# Patient Record
Sex: FEMALE | Race: WHITE | NOT HISPANIC OR LATINO | Employment: UNEMPLOYED | URBAN - METROPOLITAN AREA
[De-identification: names, ages, dates, MRNs, and addresses within clinical notes are randomized per-mention and may not be internally consistent; named-entity substitution may affect disease eponyms.]

---

## 2017-11-21 ENCOUNTER — ALLSCRIPTS OFFICE VISIT (OUTPATIENT)
Dept: OTHER | Facility: OTHER | Age: 72
End: 2017-11-21

## 2017-11-22 NOTE — CONSULTS
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)   4  Diabetes mellitus with neuropathy (250 60,357 2) (E11 40)   5  Onychomycosis (110 1) (B35 1)    Plan     · Gabapentin 300 MG Oral Capsule; TAKE 1 CAPSULE TWICE DAILY   · *VB - Foot Exam; Status:Complete;   Done: 55AHA9677 02:22PM   · Follow-up visit in 10 weeks Evaluation and Treatment  Follow-up  Status: Hold For -Scheduling  Requested for: 21Nov2017   · Diabetes Foot Exam Performed; Status:Complete;   Done: 68CMV0639 02:22PM   · Inspect your feet daily ; Status:Complete;   Done: 18GUY2092 02:22PM   · It is important to take good care of your feet if you have diabetes ; Status:Complete;  Done: 75TCV1129 02:22PM   · There are many things you can do at home to ensure good foot health ; Status:Complete;  Done: 43VLL6197 02:22PM   · Wear shoes that give your toes plenty of room ; Status:Complete;   Done: 84Lmg845725:22PM    Discussion/Summary    Use of gabapentin  The patient was counseled regarding diagnostic results,-- instructions for management,-- prognosis,-- patient and family education,-- risks and benefits of treatment options,-- importance of compliance with treatment  Patient is able to Self-Care  Possible side effects of new medications were reviewed with the patient/guardian today  The treatment plan was reviewed with the patient/guardian  The patient/guardian understands and agrees with the treatment plan      Chief Complaint  Routine nail care      History of Present Illness  HPI: Patient is diabetic  Patient complains of pain in her feet and legs at night  Patient has low back pain  Patient is taking gabapentin  She has not had any adjustment dosing over the last 4 years  Active Problems  1  Atherosclerosis of arteries of extremities (440 20) (I70 209)   2  Callus (700) (L84)   3   Deformity of ankle and foot, 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)   4  Diabetes mellitus with neuropathy (250 60,357 2) (E11 40)   5  Diabetic neuropathy (250 60,357 2) (E11 40)   6  Difficulty in walking (719 7) (R26 2)   7  Limb pain (729 5) (M79 609)   8  Onychomycosis (110 1) (B35 1)   9  Radiculopathy (729 2) (M54 10)    Social History     · Former smoker (E60 98) (E34 133)    Current Meds   1  Aspirin 81 MG TABS; Take 1 tablet twice daily; Therapy: (Recorded:21Nov2017) to Recorded   2  Carvedilol 25 MG Oral Tablet; Take 1 tablet daily; Therapy: (0472 51 11 42) to Recorded   3  Clopidogrel Bisulfate TABS; Therapy: (077 4766 3189) to Recorded   4  Furosemide 40 MG Oral Tablet; take 1 tablet every other day; Therapy: (0472 51 11 42) to Recorded   5  Gabapentin 100 MG Oral Capsule; TAKE 1 CAPSULE 3 TIMES DAILY; Therapy: 69JPW4382 to (Stefano Hassan)  Requested for: 26GQT8359; Last Rx:10Nov2014 Ordered   6  Gabapentin 100 MG Oral Capsule; TAKE 1 CAPSULE 3 TIMES DAILY; Therapy: 61NRE4334 to (Evaluate:62Hqm9493)  Requested for: 29IOC5469; Last Rx:24Oct2013 Ordered   7  Glucophage 500 MG Oral Tablet; TAKE 1 TABLET DAILY AS DIRECTED; Therapy: (0472 51 11 42) to Recorded   8  Lisinopril 20 MG Oral Tablet; TAKE 1 TABLET DAILY AS DIRECTED; Therapy: (0472 51 11 42) to Recorded   9  PARoxetine HCl TABS; TAKE 1 TABLET DAILY; Therapy: (Recorded:21Nov2017) to Recorded   10  Pravachol 40 MG Oral Tablet; TAKE 1 TABLET AT BEDTIME; Therapy: (Recorded:21Nov2017) to Recorded   11  Vitamin B-12 TABS; Therapy: (077 4766 3189) to Recorded   12  Vitamin C TABS; Therapy: (077 4766 3189) to Recorded   13  Vitamin D TABS; Therapy: (077 4766 3189) to Recorded   14  Vitamin E TABS; Therapy: (Recorded:24Oct2013) to Recorded    Allergies  1   Penicillins    Vitals   Recorded: 21Nov2017 02:04PM   Heart Rate 78   Respiration 17   Systolic 513   Diastolic 84   Height 5 ft 3 in   Weight 170 lb    BMI Calculated 30 11   BSA Calculated 1 8       Physical Exam   Constitutional: no acute distress, well appearing and well nourished  Cardiovascular: abnormal dorsalis pedis pulse,-- abnormal posterior tibialis pulse,-- abnormal capillary refill-- and-- edema  Orthopedic/Biomechanical: abnormal foot type,-- hammertoe(s),-- discolored nails-- and-- subungual debris  Skin: keratoses  Neurologic: decreased response to light touch,-- decreased vibration sensation-- and-- decreased response to monofilament testing  Psychiatric: oriented to person, place, and time  Left Foot: Appearance: Normal except as noted: excessive pronation-- and-- pes planus  Great toe deformities include a bunion  Second toe deformities include hammer toe  Tenderness: None except the medial longitudinal arch  Hyperkeratotic lesions first and fifth met heads bilateral significant xerosis bilateral negative erythema  ROM: Deferred  Special Tests: Rigid hammertoes 2 through 5 bilateral    Right Foot: Appearance: Normal except as noted: excessive pronation-- and-- pes planus  Great toe deformities include a bunion  Second toe deformities include corn  Tenderness: None except the medial longitudinal arch  ROM: Deferred  Left Ankle: ROM: limited ROM in all planes Motor: diffuse weakness  Right Ankle: ROM: limited ROM in all planes Motor: diffuse weakness  Neurological Exam: performed  Light touch was decreased bilaterally  Vibratory sensation was decreased in both first metatarsophalangeal joints  Vascular Exam: performed Dorsalis pedis pulses were diminished bilaterally  Posterior tibial pulses were diminished bilaterally  Capillary refill time was greater than 3 seconds bilaterally-- and-- Q  9, findings bilateral  Negative digital hair, positive abnormal cooling  Edema: mild bilaterally  Toenails: All of the toenails were hypertrophied-- and-- Mycotic with onychauxis  Socks and shoes removed, Right Foot Findings: swollen, erythematous and dry    The sensory exam showed diminished vibratory sensation at the level of the toes  Diminished tactile sensation with monofilament testing throughout the right foot  Socks and shoes removed, Left Foot Findings: swollen, erythematous and dry  The sensory exam showed diminished vibratory sensation at the level of the toes  Diminished tactile sensation with monofilament testing throughout the left foot  Hyperkeratosis: present on both first toes-- and-- present on both first sub metatarsals  Shoe Gear Evaluation: performed ()  Right Foot: width: d-- and-- length: 9  Left Foot: width: d-- and-- length: 9  Recommendation(s): custom inlays  Procedure  Diabetic foot exam performed  Patient educated on conditions  All mycotic nails debrided  Will increase patient's gabapentin dosing   She would benefit from custom diabetic inlays      Signatures   Electronically signed by : Lonnie Ralph DPM; Nov 21 2017  2:25PM EST                       (Author)

## 2018-01-03 ENCOUNTER — ALLSCRIPTS OFFICE VISIT (OUTPATIENT)
Dept: OTHER | Facility: OTHER | Age: 73
End: 2018-01-03

## 2018-01-04 NOTE — PROGRESS NOTES
Assessment   1  Atherosclerosis of arteries of extremities (440 20) (I70 209)   2  Callus (700) (L84)   3  Diabetes mellitus with neuropathy (250 60,357 2) (E11 40)   4  Onychomycosis (110 1) (B35 1)   5  Radiculopathy (729 2) (M54 10)    Plan    · Gabapentin 600 MG Oral Tablet; TAKE 1 TABLET TWICE DAILY   · *VB - Foot Exam; Status:Complete;   Done: 61ZMF9707 03:11PM   · Follow-up visit in 9 weeks Evaluation and Treatment  Follow-up  Status: Hold For -    Scheduling  Requested for: 67SJQ4500   · Diabetes Foot Exam Performed; Status:Complete;   Done: 01OGR0502 03:11PM   · Inspect your feet daily ; Status:Complete;   Done: 99XAO6185 03:11PM   · It is important to take good care of your feet if you have diabetes ; Status:Complete;      Done: 67TUN5833 03:11PM   · There are many things you can do at home to ensure good foot health ; Status:Complete;      Done: 98ZIG6176 03:11PM   · Wear shoes that give your toes plenty of room ; Status:Complete;   Done: 82NRI1344    03:11PM    Discussion/Summary      Use of gabapentin  The patient was counseled regarding diagnostic results,-- instructions for management,-- prognosis,-- patient and family education,-- risks and benefits of treatment options,-- importance of compliance with treatment  Patient is able to Self-Care  Possible side effects of new medications were reviewed with the patient/guardian today  The treatment plan was reviewed with the patient/guardian  The patient/guardian understands and agrees with the treatment plan      Chief Complaint   Routine nail care      History of Present Illness   HPI: Patient is diabetic  Patient complains of pain in her feet and legs at night  Patient has low back pain  Patient is taking gabapentin  She has not had any adjustment dosing over the last 4 years  Active Problems   1  Atherosclerosis of arteries of extremities (440 20) (I70 209)   2  Callus (700) (L84)   3   Deformity of ankle and foot, 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)   4  Diabetes mellitus with neuropathy (250 60,357 2) (E11 40)   5  Diabetic neuropathy (250 60,357 2) (E11 40)   6  Difficulty in walking (719 7) (R26 2)   7  Limb pain (729 5) (M79 609)   8  Onychomycosis (110 1) (B35 1)   9  Radiculopathy (729 2) (M54 10)    Social History    · Former smoker (M55 04) (I24 667)    Current Meds    1  Aspirin 81 MG TABS; Take 1 tablet twice daily; Therapy: (Recorded:21Nov2017) to Recorded   2  Carvedilol 25 MG Oral Tablet; Take 1 tablet daily; Therapy: (75 196 772) to Recorded   3  Clopidogrel Bisulfate TABS; Therapy: (468 6549) to Recorded   4  Furosemide 40 MG Oral Tablet; take 1 tablet every other day; Therapy: (75 196 772) to Recorded   5  Gabapentin 300 MG Oral Capsule; TAKE 1 CAPSULE TWICE DAILY; Therapy: 62EAV0177 to (Evaluate:20Jan2018)  Requested for: 21Nov2017; Last     Rx:21Nov2017 Ordered   6  Glucophage 500 MG Oral Tablet; TAKE 1 TABLET DAILY AS DIRECTED; Therapy: (75 196 772) to Recorded   7  Lisinopril 20 MG Oral Tablet; TAKE 1 TABLET DAILY AS DIRECTED; Therapy: (75 196 772) to Recorded   8  PARoxetine HCl TABS; TAKE 1 TABLET DAILY; Therapy: (75 196 772) to Recorded   9  Pravachol 40 MG Oral Tablet; TAKE 1 TABLET AT BEDTIME; Therapy: (Recorded:21Nov2017) to Recorded   10  Vitamin B-12 TABS; Therapy: (603 6549) to Recorded   11  Vitamin C TABS; Therapy: (468 6549) to Recorded   12  Vitamin D TABS; Therapy: (104 6549) to Recorded   13  Vitamin E TABS; Therapy: (Recorded:24Oct2013) to Recorded    Allergies   1  Penicillins    Vitals    Recorded: 47PBK9674 03:00PM   Heart Rate 80   Respiration 20   Systolic 524   Diastolic 83   Height 5 ft 3 in   Weight 170 lb    BMI Calculated 30 11   BSA Calculated 1 8     Physical Exam        Constitutional: no acute distress, well appearing and well nourished        Cardiovascular: abnormal dorsalis pedis pulse,-- abnormal posterior tibialis pulse,-- abnormal capillary refill-- and-- edema  Orthopedic/Biomechanical: abnormal foot type,-- hammertoe(s),-- discolored nails-- and-- subungual debris  Skin: keratoses  Neurologic: decreased response to light touch,-- decreased vibration sensation-- and-- decreased response to monofilament testing  Psychiatric: oriented to person, place, and time  Left Foot: Appearance: Normal except as noted: excessive pronation-- and-- pes planus  Great toe deformities include a bunion  Second toe deformities include hammer toe  Tenderness: None except the medial longitudinal arch  Hyperkeratotic lesions first and fifth met heads bilateral significant xerosis bilateral negative erythema  ROM: Deferred  Special Tests: Rigid hammertoes 2 through 5 bilateral     Right Foot: Appearance: Normal except as noted: excessive pronation-- and-- pes planus  Great toe deformities include a bunion  Second toe deformities include corn  Tenderness: None except the medial longitudinal arch  ROM: Deferred  Left Ankle: ROM: limited ROM in all planes Motor: diffuse weakness  Right Ankle: ROM: limited ROM in all planes Motor: diffuse weakness  Neurological Exam: performed  Light touch was decreased bilaterally  Vibratory sensation was decreased in both first metatarsophalangeal joints  Vascular Exam: performed Dorsalis pedis pulses were diminished bilaterally  Posterior tibial pulses were diminished bilaterally  Capillary refill time was greater than 3 seconds bilaterally-- and-- Q  9, findings bilateral  Negative digital hair, positive abnormal cooling  Edema: mild bilaterally  Toenails: All of the toenails were hypertrophied-- and-- Mycotic with onychauxis  Socks and shoes removed, Right Foot Findings: swollen, erythematous and dry  The sensory exam showed diminished vibratory sensation at the level of the toes   Diminished tactile sensation with monofilament testing throughout the right foot  Socks and shoes removed, Left Foot Findings: swollen, erythematous and dry  The sensory exam showed diminished vibratory sensation at the level of the toes  Diminished tactile sensation with monofilament testing throughout the left foot  Hyperkeratosis: present on both first toes-- and-- present on both first sub metatarsals  Shoe Gear Evaluation: performed ()  Right Foot: width: d-- and-- length: 9  Left Foot: width: d-- and-- length: 9  Recommendation(s): custom inlays  Procedure   Diabetic foot exam performed  Patient educated on conditions  All mycotic nails debrided  Will increase patient's gabapentin dosing   She would benefit from custom diabetic inlays      Future Appointments      Date/Time Provider Specialty Site   02/12/2018 01:30 PM Niles Mcardle, Nurse Schedule  [address] [facility]     Signatures    Electronically signed by : Alexus Phan DPM; Javi  3 2018  3:11PM EST                       (Author)

## 2018-01-14 VITALS
HEART RATE: 78 BPM | SYSTOLIC BLOOD PRESSURE: 121 MMHG | WEIGHT: 170 LBS | RESPIRATION RATE: 17 BRPM | DIASTOLIC BLOOD PRESSURE: 84 MMHG | BODY MASS INDEX: 30.12 KG/M2 | HEIGHT: 63 IN

## 2018-01-23 VITALS
BODY MASS INDEX: 30.12 KG/M2 | SYSTOLIC BLOOD PRESSURE: 182 MMHG | WEIGHT: 170 LBS | HEART RATE: 80 BPM | HEIGHT: 63 IN | DIASTOLIC BLOOD PRESSURE: 83 MMHG | RESPIRATION RATE: 20 BRPM

## 2018-03-14 ENCOUNTER — OFFICE VISIT (OUTPATIENT)
Dept: PODIATRY | Facility: CLINIC | Age: 73
End: 2018-03-14
Payer: MEDICARE

## 2018-03-14 VITALS
SYSTOLIC BLOOD PRESSURE: 139 MMHG | DIASTOLIC BLOOD PRESSURE: 70 MMHG | RESPIRATION RATE: 16 BRPM | HEIGHT: 62 IN | BODY MASS INDEX: 27.23 KG/M2 | WEIGHT: 148 LBS | HEART RATE: 76 BPM

## 2018-03-14 DIAGNOSIS — E11.42 DIABETIC POLYNEUROPATHY ASSOCIATED WITH TYPE 2 DIABETES MELLITUS (HCC): Primary | ICD-10-CM

## 2018-03-14 DIAGNOSIS — L84 CORNS: ICD-10-CM

## 2018-03-14 DIAGNOSIS — M79.671 PAIN IN BOTH FEET: ICD-10-CM

## 2018-03-14 DIAGNOSIS — M79.672 PAIN IN BOTH FEET: ICD-10-CM

## 2018-03-14 DIAGNOSIS — B35.1 ONYCHOMYCOSIS: ICD-10-CM

## 2018-03-14 DIAGNOSIS — I70.209 PERIPHERAL ARTERIOSCLEROSIS (HCC): ICD-10-CM

## 2018-03-14 DIAGNOSIS — M54.16 RADICULOPATHY OF LUMBAR REGION: ICD-10-CM

## 2018-03-14 PROCEDURE — 99212 OFFICE O/P EST SF 10 MIN: CPT | Performed by: PODIATRIST

## 2018-03-14 PROCEDURE — 11056 PARNG/CUTG B9 HYPRKR LES 2-4: CPT | Performed by: PODIATRIST

## 2018-03-14 RX ORDER — FUROSEMIDE 40 MG/1
40 TABLET ORAL DAILY PRN
COMMUNITY

## 2018-03-14 RX ORDER — LISINOPRIL 20 MG/1
20 TABLET ORAL DAILY
Status: ON HOLD | COMMUNITY
End: 2019-09-28

## 2018-03-14 RX ORDER — PAROXETINE HYDROCHLORIDE 40 MG/1
1 TABLET, FILM COATED ORAL DAILY
Status: ON HOLD | COMMUNITY
End: 2019-09-28 | Stop reason: CLARIF

## 2018-03-14 RX ORDER — CARVEDILOL 25 MG/1
25 TABLET ORAL 2 TIMES DAILY WITH MEALS
COMMUNITY

## 2018-03-14 RX ORDER — GABAPENTIN 100 MG/1
1 CAPSULE ORAL 3 TIMES DAILY
COMMUNITY
Start: 2014-11-10 | End: 2018-03-14 | Stop reason: SDUPTHER

## 2018-03-14 RX ORDER — CYANOCOBALAMIN (VITAMIN B-12) 500 MCG
400 LOZENGE ORAL DAILY
COMMUNITY

## 2018-03-14 RX ORDER — GABAPENTIN 100 MG/1
100 CAPSULE ORAL 3 TIMES DAILY
Qty: 90 CAPSULE | Refills: 1 | Status: SHIPPED | OUTPATIENT
Start: 2018-03-14 | End: 2018-04-13

## 2018-03-14 RX ORDER — CLOPIDOGREL 300 MG/1
TABLET, FILM COATED ORAL
Status: ON HOLD | COMMUNITY
End: 2019-09-28

## 2018-03-14 RX ORDER — RIBOFLAVIN (VITAMIN B2) 100 MG
500 TABLET ORAL DAILY
COMMUNITY

## 2018-03-14 RX ORDER — PRAVASTATIN SODIUM 40 MG
40 TABLET ORAL DAILY
Status: ON HOLD | COMMUNITY
End: 2019-09-28

## 2018-03-14 RX ORDER — UBIDECARENONE 75 MG
1000 CAPSULE ORAL DAILY
COMMUNITY

## 2018-03-14 NOTE — PROGRESS NOTES
Assessment/Plan:  Pain  Diabetic neuropathy  Callus of mycotic toenail  Plan  Diabetic foot exam performed  Refill of gabapentin ordered  All nails debrided  Plantar skin lesions debrided without pain or complication    No problem-specific Assessment & Plan notes found for this encounter  There are no diagnoses linked to this encounter  Subjective:      Patient ID: Yesica Zapata is a 71 y o  female  Patient is diabetic  She complains of pain in her feet with ambulation  No history of trauma  She is taking gabapentin  This gives her some relief  The following portions of the patient's history were reviewed and updated as appropriate: allergies, current medications, past family history, past medical history, past social history, past surgical history and problem list     Review of Systems    Active Problems   1  Atherosclerosis of arteries of extremities (440 20) (I70 209)   2  Callus (700) (L84)   3  Deformity of ankle and foot, 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)   4  Diabetes mellitus with neuropathy (250 60,357 2) (E11 40)   5  Diabetic neuropathy (250 60,357 2) (E11 40)   6  Difficulty in walking (719 7) (R26 2)   7  Limb pain (729 5) (M79 609)   8  Onychomycosis (110 1) (B35 1)   9  Radiculopathy (729 2) (M54 10)     Social History    · Former smoker (M86 32) (Z03 915)     Current Meds    1  Aspirin 81 MG TABS; Take 1 tablet twice daily; Therapy: (Recorded:21Nov2017) to Recorded   2  Carvedilol 25 MG Oral Tablet; Take 1 tablet daily; Therapy: (0472 51 11 42) to Recorded   3  Clopidogrel Bisulfate TABS; Therapy: (468 7325) to Recorded   4  Furosemide 40 MG Oral Tablet; take 1 tablet every other day; Therapy: (0472 51 11 42) to Recorded   5  Gabapentin 300 MG Oral Capsule; TAKE 1 CAPSULE TWICE DAILY; Therapy: 65QKM4413 to (Evaluate:20Jan2018)  Requested for: 21Nov2017; Last     Rx:21Nov2017 Ordered   6   Glucophage 500 MG Oral Tablet; TAKE 1 TABLET DAILY AS DIRECTED; Therapy: (66 846 207) to Recorded   7  Lisinopril 20 MG Oral Tablet; TAKE 1 TABLET DAILY AS DIRECTED; Therapy: (50 700 207) to Recorded   8  PARoxetine HCl TABS; TAKE 1 TABLET DAILY; Therapy: (94 349 207) to Recorded   9  Pravachol 40 MG Oral Tablet; TAKE 1 TABLET AT BEDTIME; Therapy: (Recorded:21Nov2017) to Recorded   10  Vitamin B-12 TABS; Therapy: (345 2180) to Recorded   11  Vitamin C TABS; Therapy: (095 6522) to Recorded   12  Vitamin D TABS; Therapy: (539 6579) to Recorded   13  Vitamin E TABS; Therapy: (Recorded:24Oct2013) to Recorded     Allergies   1  Penicillins     Vitals     Recorded: 84UNZ1338 03:00PM   Heart Rate 80   Respiration 20   Systolic 213   Diastolic 83   Height 5 ft 3 in   Weight 170 lb    BMI Calculated 30 11   BSA Calculated 1 8      Physical Exam        Constitutional: no acute distress, well appearing and well nourished  Cardiovascular: abnormal dorsalis pedis pulse,-- abnormal posterior tibialis pulse,-- abnormal capillary refill-- and-- edema  Orthopedic/Biomechanical: abnormal foot type,-- hammertoe(s),-- discolored nails-- and-- subungual debris  Skin: keratoses  Neurologic: decreased response to light touch,-- decreased vibration sensation-- and-- decreased response to monofilament testing  Psychiatric: oriented to person, place, and time  Left Foot: Appearance: Normal except as noted: excessive pronation-- and-- pes planus  Great toe deformities include a bunion  Second toe deformities include hammer toe  Tenderness: None except the medial longitudinal arch  Hyperkeratotic lesions first and fifth met heads bilateral significant xerosis bilateral negative erythema  ROM: Deferred  Special Tests: Rigid hammertoes 2 through 5 bilateral     Right Foot: Appearance: Normal except as noted: excessive pronation-- and-- pes planus   Great toe deformities include a bunion  Second toe deformities include corn  Tenderness: None except the medial longitudinal arch  ROM: Deferred  Left Ankle: ROM: limited ROM in all planes Motor: diffuse weakness  Right Ankle: ROM: limited ROM in all planes Motor: diffuse weakness  Neurological Exam: performed  Light touch was decreased bilaterally  Vibratory sensation was decreased in both first metatarsophalangeal joints  Vascular Exam: performed Dorsalis pedis pulses were diminished bilaterally  Posterior tibial pulses were diminished bilaterally  Capillary refill time was greater than 3 seconds bilaterally-- and-- Q  9, findings bilateral  Negative digital hair, positive abnormal cooling  Edema: mild bilaterally  Toenails: All of the toenails were hypertrophied-- and-- Mycotic with onychauxis  Socks and shoes removed, Right Foot Findings: swollen, erythematous and dry  The sensory exam showed diminished vibratory sensation at the level of the toes  Diminished tactile sensation with monofilament testing throughout the right foot  Socks and shoes removed, Left Foot Findings: swollen, erythematous and dry  The sensory exam showed diminished vibratory sensation at the level of the toes  Diminished tactile sensation with monofilament testing throughout the left foot  Hyperkeratosis: present on both first toes-- and-- present on both first sub metatarsals  Shoe Gear Evaluation: performed ()  Right Foot: width: d-- and-- length: 9  Left Foot: width: d-- and-- length: 9  Recommendation(s): custom inlays  Procedure   Diabetic foot exam performed  Patient educated on conditions  All mycotic nails debrided  Will increase patient's gabapentin dosing  She would benefit from custom diabetic inlays     Objective:      Foot Exam    Right Foot/Ankle     Inspection and Palpation  Skin Exam: callus and dry skin;     Neurovascular  Dorsalis pedis: 0  Posterior tibial: 1+      Left Foot/Ankle Inspection and Palpation  Skin Exam: callus and dry skin;     Neurovascular  Dorsalis pedis: 0  Posterior tibial: 1+        Physical Exam   Cardiovascular: Pulses are weak pulses  Pulses:       Dorsalis pedis pulses are 0 on the right side, and 0 on the left side  Posterior tibial pulses are 1+ on the right side, and 1+ on the left side  Feet:   Right Foot:   Skin Integrity: Positive for callus and dry skin  Left Foot:   Skin Integrity: Positive for callus and dry skin  Patient's shoes and socks removed  Right Foot/Ankle   Right Foot Inspection  Skin Exam: dry skin, callus, pre-ulcer and callus                          Toe Exam: swelling and erythema  Sensory   Vibration: absent  Proprioception: absent   Monofilament testing: absent  Vascular  Capillary refills: elevated  The right DP pulse is 0  The right PT pulse is 1+  Left Foot/Ankle  Left Foot Inspection  Skin Exam: dry skin, pre-ulcer and callus                         Toe Exam: swelling and erythema                     Vascular    The left DP pulse is 0  The left PT pulse is 1+  Assign Risk Category:  Deformity present;  Loss of protective sensation; Weak pulses       Risk: 2

## 2018-05-31 ENCOUNTER — OFFICE VISIT (OUTPATIENT)
Dept: PODIATRY | Facility: CLINIC | Age: 73
End: 2018-05-31
Payer: MEDICARE

## 2018-05-31 VITALS
BODY MASS INDEX: 27.23 KG/M2 | HEIGHT: 62 IN | SYSTOLIC BLOOD PRESSURE: 182 MMHG | WEIGHT: 148 LBS | DIASTOLIC BLOOD PRESSURE: 125 MMHG

## 2018-05-31 DIAGNOSIS — M79.671 PAIN IN BOTH FEET: ICD-10-CM

## 2018-05-31 DIAGNOSIS — M79.672 PAIN IN BOTH FEET: ICD-10-CM

## 2018-05-31 DIAGNOSIS — M54.16 RADICULOPATHY OF LUMBAR REGION: Primary | ICD-10-CM

## 2018-05-31 DIAGNOSIS — I70.209 PERIPHERAL ARTERIOSCLEROSIS (HCC): ICD-10-CM

## 2018-05-31 DIAGNOSIS — E11.42 DIABETIC POLYNEUROPATHY ASSOCIATED WITH TYPE 2 DIABETES MELLITUS (HCC): ICD-10-CM

## 2018-05-31 DIAGNOSIS — B35.1 ONYCHOMYCOSIS: ICD-10-CM

## 2018-05-31 DIAGNOSIS — L84 CORNS: ICD-10-CM

## 2018-05-31 PROCEDURE — 99212 OFFICE O/P EST SF 10 MIN: CPT | Performed by: PODIATRIST

## 2018-05-31 PROCEDURE — 11056 PARNG/CUTG B9 HYPRKR LES 2-4: CPT | Performed by: PODIATRIST

## 2018-05-31 RX ORDER — INSULIN GLARGINE 100 [IU]/ML
15 INJECTION, SOLUTION SUBCUTANEOUS EVERY 12 HOURS SCHEDULED
Status: ON HOLD | COMMUNITY
Start: 2018-04-16 | End: 2019-09-28

## 2018-05-31 RX ORDER — PAROXETINE 10 MG/1
20 TABLET, FILM COATED ORAL DAILY
COMMUNITY
End: 2019-11-21

## 2018-05-31 RX ORDER — CARVEDILOL 25 MG/1
25 TABLET ORAL
Status: ON HOLD | COMMUNITY
Start: 2017-05-30 | End: 2019-09-28

## 2018-05-31 RX ORDER — GABAPENTIN 100 MG/1
CAPSULE ORAL
COMMUNITY
Start: 2018-04-25 | End: 2019-05-31 | Stop reason: SDUPTHER

## 2018-05-31 RX ORDER — PAROXETINE HYDROCHLORIDE 20 MG/1
TABLET, FILM COATED ORAL
Status: ON HOLD | COMMUNITY
Start: 2018-05-10 | End: 2019-09-28

## 2018-05-31 RX ORDER — TRAMADOL HYDROCHLORIDE 50 MG/1
50 TABLET ORAL EVERY 8 HOURS PRN
Status: ON HOLD | COMMUNITY
Start: 2018-02-22 | End: 2019-09-28

## 2018-05-31 RX ORDER — INSULIN GLARGINE 100 [IU]/ML
INJECTION, SOLUTION SUBCUTANEOUS
Status: ON HOLD | COMMUNITY
End: 2019-09-28

## 2018-05-31 RX ORDER — LISINOPRIL 20 MG/1
20 TABLET ORAL
Status: ON HOLD | COMMUNITY
Start: 2017-08-11 | End: 2019-09-28

## 2018-05-31 RX ORDER — REPAGLINIDE 1 MG/1
TABLET ORAL
Status: ON HOLD | COMMUNITY
Start: 2016-10-03 | End: 2019-09-28

## 2018-05-31 RX ORDER — GABAPENTIN 600 MG/1
TABLET ORAL
Status: ON HOLD | COMMUNITY
Start: 2018-03-04 | End: 2019-09-28

## 2018-05-31 RX ORDER — GABAPENTIN 100 MG/1
100 CAPSULE ORAL
COMMUNITY
Start: 2017-11-14 | End: 2018-07-23 | Stop reason: SDUPTHER

## 2018-05-31 RX ORDER — RANITIDINE 150 MG/1
150 TABLET ORAL
COMMUNITY

## 2018-05-31 RX ORDER — PRAVASTATIN SODIUM 40 MG
40 TABLET ORAL
Status: ON HOLD | COMMUNITY
Start: 2017-03-24 | End: 2019-09-28

## 2018-05-31 RX ORDER — FUROSEMIDE 40 MG/1
40 TABLET ORAL
Status: ON HOLD | COMMUNITY
Start: 2017-08-31 | End: 2019-09-28

## 2018-05-31 RX ORDER — ASPIRIN 81 MG/1
81 TABLET, CHEWABLE ORAL DAILY
COMMUNITY

## 2018-05-31 NOTE — PROGRESS NOTES
Procedures   Foot Exam   Assessment/Plan:  Pain  Diabetic neuropathy  Callus of mycotic toenail      Plan  Diabetic foot exam performed  Refill of gabapentin ordered  All nails debrided  Plantar skin lesions debrided without pain or complication     No problem-specific Assessment & Plan notes found for this encounter          There are no diagnoses linked to this encounter        Subjective:       Patient ID: Topher Arredondo is a 71 y o  female      Patient is diabetic  She complains of pain in her feet with ambulation  No history of trauma  She is taking gabapentin  This gives her some relief         The following portions of the patient's history were reviewed and updated as appropriate: allergies, current medications, past family history, past medical history, past social history, past surgical history and problem list      Review of Systems    Active Problems   1  Atherosclerosis of arteries of extremities (440 20) (I70 209)   2  Callus (700) (L84)   3  Deformity of ankle and foot, 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)   4  Diabetes mellitus with neuropathy (250 60,357 2) (E11 40)   5  Diabetic neuropathy (250 60,357 2) (E11 40)   6  Difficulty in walking (719 7) (R26 2)   7  Limb pain (729 5) (M79 609)   8  Onychomycosis (110 1) (B35 1)   9  Radiculopathy (729 2) (M54 10)     Social History    · Former smoker (V15 82) (Z87 891)     Current Meds    1  Aspirin 81 MG TABS; Take 1 tablet twice daily;     Therapy: (Recorded:21Nov2017) to Recorded   2  Carvedilol 25 MG Oral Tablet;  Take 1 tablet daily;     Therapy: (Recorded:21Nov2017) to Recorded   3  Clopidogrel Bisulfate TABS;     Therapy: ([id number]) to Recorded   4  Furosemide 40 MG Oral Tablet; take 1 tablet every other day;     Therapy: (Recorded:21Nov2017) to Recorded   5  Gabapentin 300 MG Oral Capsule; TAKE 1 CAPSULE TWICE DAILY;     Therapy: [id number] to (Evaluate:20Jan2018)  Requested for: 21Nov2017; Last     Rx:21Nov2017 Ordered   6  Glucophage 500 MG Oral Tablet; TAKE 1 TABLET DAILY AS DIRECTED;     Therapy: (Recorded:21Nov2017) to Recorded   7  Lisinopril 20 MG Oral Tablet; TAKE 1 TABLET DAILY AS DIRECTED;     Therapy: (Recorded:21Nov2017) to Recorded   8  PARoxetine HCl TABS; TAKE 1 TABLET DAILY;     Therapy: (Recorded:21Nov2017) to Recorded   9  Pravachol 40 MG Oral Tablet; TAKE 1 TABLET AT BEDTIME;     Therapy: (Recorded:21Nov2017) to Recorded   10  Vitamin B-12 TABS;      Therapy: (Recorded:24Oct2013) to Recorded   11  Vitamin C TABS;      Therapy: (Recorded:24Oct2013) to Recorded   12  Vitamin D TABS;      Therapy: (Recorded:24Oct2013) to Recorded   13  Vitamin E TABS;      Therapy: (Recorded:24Oct2013) to Recorded     Allergies   1  Penicillins     Vitals        Heart Rate 80   Respiration 20   Systolic 542   Diastolic 83   Height 5 ft 3 in   Weight 170 lb    BMI Calculated 30 11   BSA Calculated 1 8      Physical Exam        Constitutional: no acute distress, well appearing and well nourished       Cardiovascular: abnormal dorsalis pedis pulse,-- abnormal posterior tibialis pulse,-- abnormal capillary refill-- and-- edema       Orthopedic/Biomechanical: abnormal foot type,-- hammertoe(s),-- discolored nails-- and-- subungual debris       Skin: keratoses       Neurologic: decreased response to light touch,-- decreased vibration sensation-- and-- decreased response to monofilament testing       Psychiatric: oriented to person, place, and time     Left Foot: Appearance: Normal except as noted: excessive pronation-- and-- pes planus  Great toe deformities include a bunion  Second toe deformities include hammer toe  Tenderness: None except the medial longitudinal arch  Hyperkeratotic lesions first and fifth met heads bilateral significant xerosis bilateral negative erythema  ROM: Deferred  Special Tests: Rigid hammertoes 2 through 5 bilateral     Right Foot: Appearance: Normal except as noted: excessive pronation-- and-- pes planus   Great toe deformities include a bunion  Second toe deformities include corn  Tenderness: None except the medial longitudinal arch  ROM: Deferred     Left Ankle: ROM: limited ROM in all planes Motor: diffuse weakness     Right Ankle: ROM: limited ROM in all planes Motor: diffuse weakness     Neurological Exam: performed  Light touch was decreased bilaterally  Vibratory sensation was decreased in both first metatarsophalangeal joints     Vascular Exam: performed Dorsalis pedis pulses were diminished bilaterally  Posterior tibial pulses were diminished bilaterally  Capillary refill time was greater than 3 seconds bilaterally-- and-- Q  9, findings bilateral  Negative digital hair, positive abnormal cooling  Edema: mild bilaterally     Toenails: All of the toenails were hypertrophied-- and-- Mycotic with onychauxis          Socks and shoes removed, Right Foot Findings: swollen, erythematous and dry       The sensory exam showed diminished vibratory sensation at the level of the toes  Diminished tactile sensation with monofilament testing throughout the right foot       Socks and shoes removed, Left Foot Findings: swollen, erythematous and dry       The sensory exam showed diminished vibratory sensation at the level of the toes  Diminished tactile sensation with monofilament testing throughout the left foot         Hyperkeratosis: present on both first toes-- and-- present on both first sub metatarsals     Shoe Gear Evaluation: performed ()  Right Foot: width: d-- and-- length: 9  Left Foot: width: d-- and-- length: 9  Recommendation(s): custom inlays       Procedure   Diabetic foot exam performed  Patient educated on conditions     Objective:      Foot Exam     Right Foot/Ankle      Inspection and Palpation  Skin Exam: callus and dry skin;      Neurovascular  Dorsalis pedis: 0  Posterior tibial: 1+        Left Foot/Ankle       Inspection and Palpation  Skin Exam: callus and dry skin;      Neurovascular  Dorsalis pedis: 0  Posterior tibial: 1+        Physical Exam   Cardiovascular: Pulses are weak pulses  Pulses:       Dorsalis pedis pulses are 0 on the right side, and 0 on the left side  Posterior tibial pulses are 1+ on the right side, and 1+ on the left side  Feet:   Right Foot:   Skin Integrity: Positive for callus and dry skin  Left Foot:   Skin Integrity: Positive for callus and dry skin  Patient's shoes and socks removed  Right Foot/Ankle   Right Foot Inspection  Skin Exam: dry skin, callus, pre-ulcer and callus                           Toe Exam: swelling and erythema  Sensory   Vibration: absent  Proprioception: absent   Monofilament testing: absent  Vascular  Capillary refills: elevated  The right DP pulse is 0  The right PT pulse is 1+       Left Foot/Ankle  Left Foot Inspection  Skin Exam: dry skin, pre-ulcer and callus                          Toe Exam: swelling and erythema                      Vascular     The left DP pulse is 0  The left PT pulse is 1+  Assign Risk Category:  Deformity present;  Loss of protective sensation; Weak pulses       Risk: 2

## 2018-07-23 DIAGNOSIS — M79.671 PAIN IN BOTH FEET: Primary | ICD-10-CM

## 2018-07-23 DIAGNOSIS — M79.672 PAIN IN BOTH FEET: Primary | ICD-10-CM

## 2018-07-23 RX ORDER — GABAPENTIN 100 MG/1
100 CAPSULE ORAL 3 TIMES DAILY
Qty: 90 CAPSULE | Refills: 0 | Status: SHIPPED | OUTPATIENT
Start: 2018-07-23

## 2018-08-10 ENCOUNTER — OFFICE VISIT (OUTPATIENT)
Dept: PODIATRY | Facility: CLINIC | Age: 73
End: 2018-08-10
Payer: MEDICARE

## 2018-08-10 VITALS
SYSTOLIC BLOOD PRESSURE: 195 MMHG | RESPIRATION RATE: 17 BRPM | BODY MASS INDEX: 27.23 KG/M2 | HEART RATE: 66 BPM | HEIGHT: 62 IN | WEIGHT: 148 LBS | DIASTOLIC BLOOD PRESSURE: 81 MMHG

## 2018-08-10 DIAGNOSIS — M79.672 PAIN IN BOTH FEET: ICD-10-CM

## 2018-08-10 DIAGNOSIS — I70.209 PERIPHERAL ARTERIOSCLEROSIS (HCC): ICD-10-CM

## 2018-08-10 DIAGNOSIS — M54.16 RADICULOPATHY OF LUMBAR REGION: Primary | ICD-10-CM

## 2018-08-10 DIAGNOSIS — E11.42 DIABETIC POLYNEUROPATHY ASSOCIATED WITH TYPE 2 DIABETES MELLITUS (HCC): ICD-10-CM

## 2018-08-10 DIAGNOSIS — L84 CORNS: ICD-10-CM

## 2018-08-10 DIAGNOSIS — B35.1 ONYCHOMYCOSIS: ICD-10-CM

## 2018-08-10 DIAGNOSIS — M79.671 PAIN IN BOTH FEET: ICD-10-CM

## 2018-08-10 PROCEDURE — 11056 PARNG/CUTG B9 HYPRKR LES 2-4: CPT | Performed by: PODIATRIST

## 2018-08-10 PROCEDURE — 99212 OFFICE O/P EST SF 10 MIN: CPT | Performed by: PODIATRIST

## 2018-08-10 RX ORDER — GABAPENTIN 300 MG/1
300 CAPSULE ORAL 2 TIMES DAILY
Qty: 60 CAPSULE | Refills: 1 | Status: SHIPPED | OUTPATIENT
Start: 2018-08-10 | End: 2019-05-31 | Stop reason: SDUPTHER

## 2018-08-10 NOTE — PROGRESS NOTES
Procedures   Foot Exam       Assessment/Plan:  Pain   Diabetic neuropathy   Callus of mycotic toenail      Plan   Diabetic foot exam performed   Refill of gabapentin ordered   All nails debrided   Plantar skin lesions debrided without pain or complication     No problem-specific Assessment & Plan notes found for this encounter          There are no diagnoses linked to this encounter        Subjective:   Patient has pain in her feet  Patient states she has to undergo vascular workup and treatment  She still has low back pain  She has pain in her back and feet at night       Patient ID: Bess Dillard is a 71 y  o  female      Patient is diabetic   She complains of pain in her feet with ambulation   No history of trauma   She is taking gabapentin   This gives her some relief         The following portions of the patient's history were reviewed and updated as appropriate: allergies, current medications, past family history, past medical history, past social history, past surgical history and problem list      Review of Systems    Active Problems   1  Atherosclerosis of arteries of extremities (440 20) (I70 209)   2  Callus (700) (L84)   3  Deformity of ankle and foot, 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)   4  Diabetes mellitus with neuropathy (250 60,357 2) (E11 40)   5  Diabetic neuropathy (250 60,357 2) (E11 40)   6  Difficulty in walking (719 7) (R26 2)   7  Limb pain (729 5) (M79 609)   8  Onychomycosis (110 1) (B35 1)   9  Radiculopathy (729 2) (M54 10)     Social History    · Former smoker (V15 82) (Z87 891)     Current Meds    1  Aspirin 81 MG TABS; Take 1 tablet twice daily;     Therapy: (Recorded:21Nov2017) to Recorded   2  Carvedilol 25 MG Oral Tablet;  Take 1 tablet daily;     Therapy: (Recorded:21Nov2017) to Recorded   3  Clopidogrel Bisulfate TABS;     Therapy: ([id number]) to Recorded   4  Furosemide 40 MG Oral Tablet; take 1 tablet every other day;     Therapy: (Ryan Ibarra) to Recorded   5  Gabapentin 300 MG Oral Capsule; TAKE 1 CAPSULE TWICE DAILY;     Therapy: [id number] to (Evaluate:20Jan2018)  Requested for: 21Nov2017; Last     Rx:21Nov2017 Ordered   6  Glucophage 500 MG Oral Tablet; TAKE 1 TABLET DAILY AS DIRECTED;     Therapy: (Recorded:21Nov2017) to Recorded   7  Lisinopril 20 MG Oral Tablet; TAKE 1 TABLET DAILY AS DIRECTED;     Therapy: (Recorded:21Nov2017) to Recorded   8  PARoxetine HCl TABS; TAKE 1 TABLET DAILY;     Therapy: (Recorded:21Nov2017) to Recorded   9  Pravachol 40 MG Oral Tablet; TAKE 1 TABLET AT BEDTIME;     Therapy: (Recorded:21Nov2017) to Recorded   10  Vitamin B-12 TABS;      Therapy: (Recorded:24Oct2013) to Recorded   11  Vitamin C TABS;      Therapy: (Recorded:24Oct2013) to Recorded   12  Vitamin D TABS;      Therapy: (Recorded:24Oct2013) to Recorded   13  Vitamin E TABS;      Therapy: (Recorded:24Oct2013) to Recorded     Allergies   1  Penicillins     Vitals         Heart Rate 80   Respiration 20   Systolic 832   Diastolic 83   Height 5 ft 3 in   Weight 170 lb    BMI Calculated 30 11   BSA Calculated 1 8      Physical Exam        Constitutional: no acute distress, well appearing and well nourished       Cardiovascular: abnormal dorsalis pedis pulse,-- abnormal posterior tibialis pulse,-- abnormal capillary refill-- and-- edema       Orthopedic/Biomechanical: abnormal foot type,-- hammertoe(s),-- discolored nails-- and-- subungual debris       Skin: keratoses       Neurologic: decreased response to light touch,-- decreased vibration sensation-- and-- decreased response to monofilament testing       Psychiatric: oriented to person, place, and time     Left Foot: Appearance: Normal except as noted: excessive pronation-- and-- pes planus  Great toe deformities include a bunion  Second toe deformities include hammer toe  Tenderness: None except the medial longitudinal arch   Hyperkeratotic lesions first and fifth met heads bilateral significant xerosis bilateral negative erythema  ROM: Deferred  Special Tests: Rigid hammertoes 2 through 5 bilateral     Right Foot: Appearance: Normal except as noted: excessive pronation-- and-- pes planus  Great toe deformities include a bunion  Second toe deformities include corn  Tenderness: None except the medial longitudinal arch  ROM: Deferred     Left Ankle: ROM: limited ROM in all planes Motor: diffuse weakness     Right Ankle: ROM: limited ROM in all planes Motor: diffuse weakness     Neurological Exam: performed  Light touch was decreased bilaterally  Vibratory sensation was decreased in both first metatarsophalangeal joints     Vascular Exam: performed Dorsalis pedis pulses were diminished bilaterally  Posterior tibial pulses were diminished bilaterally  Capillary refill time was greater than 3 seconds bilaterally-- and-- Q  9, findings bilateral  Negative digital hair, positive abnormal cooling  Edema: mild bilaterally     Toenails: All of the toenails were hypertrophied-- and-- Mycotic with onychauxis          Socks and shoes removed, Right Foot Findings: swollen, erythematous and dry       The sensory exam showed diminished vibratory sensation at the level of the toes  Diminished tactile sensation with monofilament testing throughout the right foot       Socks and shoes removed, Left Foot Findings: swollen, erythematous and dry       The sensory exam showed diminished vibratory sensation at the level of the toes  Diminished tactile sensation with monofilament testing throughout the left foot         Hyperkeratosis: present on both first toes-- and-- present on both first sub metatarsals     Shoe Gear Evaluation: performed ()  Right Foot: width: d-- and-- length: 9  Left Foot: width: d-- and-- length: 9  Recommendation(s): custom inlays       Procedure   Diabetic foot exam performed  Patient educated on conditions  All nails debrided    Patient will continue with gabapentin  Objective:      Foot Exam     Right Foot/Ankle      Inspection and Palpation  Skin Exam: callus and dry skin;      Neurovascular  Dorsalis pedis: 0  Posterior tibial: 1+        Left Foot/Ankle       Inspection and Palpation  Skin Exam: callus and dry skin;      Neurovascular  Dorsalis pedis: 0  Posterior tibial: 1+        Physical Exam   Cardiovascular: Pulses are weak pulses  Pulses:       Dorsalis pedis pulses are 0 on the right side, and 0 on the left side         Posterior tibial pulses are 1+ on the right side, and 1+ on the left side  Feet:   Right Foot:   Skin Integrity: Positive for callus and dry skin  Left Foot:   Skin Integrity: Positive for callus and dry skin  Patient's shoes and socks removed  Right Foot/Ankle   Right Foot Inspection  Skin Exam: dry skin, callus, pre-ulcer and callus               Toe Exam: swelling and erythema  Sensory   Vibration: absent  Proprioception: absent   Monofilament testing: absent  Vascular  Capillary refills: elevated  The right DP pulse is 0  The right PT pulse is 1+       Left Foot/Ankle  Left Foot Inspection  Skin Exam: dry skin, pre-ulcer and callus              Toe Exam: swelling and erythema                      Vascular     The left DP pulse is 0  The left PT pulse is 1+  Assign Risk Category:  Deformity present;  Loss of protective sensation; Weak pulses       Risk: 2

## 2019-05-31 DIAGNOSIS — E11.42 DIABETIC POLYNEUROPATHY ASSOCIATED WITH TYPE 2 DIABETES MELLITUS (HCC): ICD-10-CM

## 2019-05-31 DIAGNOSIS — M54.16 RADICULOPATHY OF LUMBAR REGION: ICD-10-CM

## 2019-05-31 RX ORDER — GABAPENTIN 300 MG/1
300 CAPSULE ORAL 2 TIMES DAILY
Qty: 60 CAPSULE | Refills: 1 | Status: ON HOLD | OUTPATIENT
Start: 2019-05-31 | End: 2019-09-28 | Stop reason: ALTCHOICE

## 2019-05-31 RX ORDER — GABAPENTIN 100 MG/1
300 CAPSULE ORAL 2 TIMES DAILY
Qty: 180 CAPSULE | Refills: 0 | Status: ON HOLD | OUTPATIENT
Start: 2019-05-31 | End: 2019-09-28 | Stop reason: ALTCHOICE

## 2019-08-02 ENCOUNTER — HOSPITAL ENCOUNTER (EMERGENCY)
Facility: HOSPITAL | Age: 74
Discharge: HOME | End: 2019-08-02
Attending: EMERGENCY MEDICINE | Admitting: EMERGENCY MEDICINE
Payer: MEDICARE

## 2019-08-02 VITALS
BODY MASS INDEX: 27.23 KG/M2 | TEMPERATURE: 98.4 F | HEIGHT: 62 IN | DIASTOLIC BLOOD PRESSURE: 72 MMHG | OXYGEN SATURATION: 99 % | WEIGHT: 148 LBS | HEART RATE: 60 BPM | SYSTOLIC BLOOD PRESSURE: 175 MMHG | RESPIRATION RATE: 20 BRPM

## 2019-08-02 DIAGNOSIS — R73.9 HYPERGLYCEMIA: Primary | ICD-10-CM

## 2019-08-02 DIAGNOSIS — E11.9 DIABETES (HCC): ICD-10-CM

## 2019-08-02 LAB
ALBUMIN SERPL BCP-MCNC: 3.3 G/DL (ref 3.5–5)
ALP SERPL-CCNC: 101 U/L (ref 46–116)
ALT SERPL W P-5'-P-CCNC: 25 U/L (ref 12–78)
ANION GAP SERPL CALCULATED.3IONS-SCNC: 11 MMOL/L (ref 4–13)
AST SERPL W P-5'-P-CCNC: 8 U/L (ref 5–45)
BASE EX.OXY STD BLDV CALC-SCNC: 81.8 % (ref 60–80)
BASE EXCESS BLDV CALC-SCNC: -2.5 MMOL/L
BASOPHILS # BLD AUTO: 0.04 THOUSANDS/ΜL (ref 0–0.1)
BASOPHILS NFR BLD AUTO: 1 % (ref 0–1)
BETA-HYDROXYBUTYRATE: 0.1 MMOL/L
BILIRUB SERPL-MCNC: 0.3 MG/DL (ref 0.2–1)
BUN SERPL-MCNC: 16 MG/DL (ref 5–25)
CALCIUM SERPL-MCNC: 8.7 MG/DL (ref 8.3–10.1)
CHLORIDE SERPL-SCNC: 95 MMOL/L (ref 100–108)
CO2 SERPL-SCNC: 24 MMOL/L (ref 21–32)
CREAT SERPL-MCNC: 1.75 MG/DL (ref 0.6–1.3)
EOSINOPHIL # BLD AUTO: 0.19 THOUSAND/ΜL (ref 0–0.61)
EOSINOPHIL NFR BLD AUTO: 3 % (ref 0–6)
ERYTHROCYTE [DISTWIDTH] IN BLOOD BY AUTOMATED COUNT: 13.4 % (ref 11.6–15.1)
GFR SERPL CREATININE-BSD FRML MDRD: 29 ML/MIN/1.73SQ M
GLUCOSE SERPL-MCNC: 385 MG/DL (ref 65–140)
GLUCOSE SERPL-MCNC: 466 MG/DL (ref 65–140)
GLUCOSE SERPL-MCNC: 467 MG/DL (ref 65–140)
HCO3 BLDV-SCNC: 24 MMOL/L (ref 24–30)
HCT VFR BLD AUTO: 33.1 % (ref 34.8–46.1)
HGB BLD-MCNC: 10.7 G/DL (ref 11.5–15.4)
IMM GRANULOCYTES # BLD AUTO: 0.02 THOUSAND/UL (ref 0–0.2)
IMM GRANULOCYTES NFR BLD AUTO: 0 % (ref 0–2)
LYMPHOCYTES # BLD AUTO: 1.95 THOUSANDS/ΜL (ref 0.6–4.47)
LYMPHOCYTES NFR BLD AUTO: 30 % (ref 14–44)
MAGNESIUM SERPL-MCNC: 2 MG/DL (ref 1.6–2.6)
MCH RBC QN AUTO: 29.4 PG (ref 26.8–34.3)
MCHC RBC AUTO-ENTMCNC: 32.3 G/DL (ref 31.4–37.4)
MCV RBC AUTO: 91 FL (ref 82–98)
MONOCYTES # BLD AUTO: 0.58 THOUSAND/ΜL (ref 0.17–1.22)
MONOCYTES NFR BLD AUTO: 9 % (ref 4–12)
NEUTROPHILS # BLD AUTO: 3.81 THOUSANDS/ΜL (ref 1.85–7.62)
NEUTS SEG NFR BLD AUTO: 57 % (ref 43–75)
NRBC BLD AUTO-RTO: 0 /100 WBCS
O2 CT BLDV-SCNC: 13.6 ML/DL
PCO2 BLDV: 48.7 MM HG (ref 42–50)
PH BLDV: 7.31 [PH] (ref 7.3–7.4)
PLATELET # BLD AUTO: 237 THOUSANDS/UL (ref 149–390)
PMV BLD AUTO: 10.2 FL (ref 8.9–12.7)
PO2 BLDV: 49.4 MM HG (ref 35–45)
POTASSIUM SERPL-SCNC: 4 MMOL/L (ref 3.5–5.3)
PROT SERPL-MCNC: 6.3 G/DL (ref 6.4–8.2)
RBC # BLD AUTO: 3.64 MILLION/UL (ref 3.81–5.12)
SODIUM SERPL-SCNC: 130 MMOL/L (ref 136–145)
WBC # BLD AUTO: 6.59 THOUSAND/UL (ref 4.31–10.16)

## 2019-08-02 PROCEDURE — 82948 REAGENT STRIP/BLOOD GLUCOSE: CPT

## 2019-08-02 PROCEDURE — 80053 COMPREHEN METABOLIC PANEL: CPT | Performed by: EMERGENCY MEDICINE

## 2019-08-02 PROCEDURE — 96360 HYDRATION IV INFUSION INIT: CPT

## 2019-08-02 PROCEDURE — 93005 ELECTROCARDIOGRAM TRACING: CPT

## 2019-08-02 PROCEDURE — 85025 COMPLETE CBC W/AUTO DIFF WBC: CPT | Performed by: EMERGENCY MEDICINE

## 2019-08-02 PROCEDURE — 96372 THER/PROPH/DIAG INJ SC/IM: CPT

## 2019-08-02 PROCEDURE — 82805 BLOOD GASES W/O2 SATURATION: CPT | Performed by: EMERGENCY MEDICINE

## 2019-08-02 PROCEDURE — 83735 ASSAY OF MAGNESIUM: CPT | Performed by: EMERGENCY MEDICINE

## 2019-08-02 PROCEDURE — 36415 COLL VENOUS BLD VENIPUNCTURE: CPT | Performed by: EMERGENCY MEDICINE

## 2019-08-02 PROCEDURE — 99283 EMERGENCY DEPT VISIT LOW MDM: CPT

## 2019-08-02 PROCEDURE — 82010 KETONE BODYS QUAN: CPT | Performed by: EMERGENCY MEDICINE

## 2019-08-02 RX ADMIN — SODIUM CHLORIDE 1000 ML: 0.9 INJECTION, SOLUTION INTRAVENOUS at 14:55

## 2019-08-02 RX ADMIN — INSULIN HUMAN 10 UNITS: 100 INJECTION, SOLUTION PARENTERAL at 16:16

## 2019-08-02 NOTE — DISCHARGE INSTRUCTIONS
Diabetic Hyperglycemia   AMBULATORY CARE:   Diabetic hyperglycemia  is a blood glucose (sugar) level that is higher than your healthcare provider recommends  You may not have any signs and symptoms  You may have increased thirst and urinate more often than usual   Seek care immediately if:   · You have shortness of breath  · Your breath smells fruity  · You have nausea and vomiting  · You have symptoms of dehydration, such as dark yellow urine, dry mouth and lips, and dry skin  Contact your healthcare provider if:   · You continue to have higher blood sugar levels than your healthcare provider recommends  · Your blood sugar level is over 240 mg/dl and  you have ketones in your urine  · You have questions or concerns about your condition or care  Manage diabetic hyperglycemia:   · If you take diabetes medicine or insulin, take it as directed  Missed or wrong doses can cause your blood sugar to go up  · Tell your healthcare provider if you continue to have trouble managing your blood sugar  He may change the type, amount, or timing of your diabetes medicine or insulin  If you do not take diabetes medicine or insulin, you may need to start  · Work with your healthcare provider to develop a sick day plan  Illness can cause your blood sugar to rise  A sick day plan helps you control your blood sugar level when you are sick  Prevent diabetic hyperglycemia:   · Check your blood sugar levels regularly  Ask your healthcare provider how often to check your blood sugar and what your levels should be  · Follow your meal plan  Your blood sugar can go up if you eat a large meal or you eat more carbohydrates than recommended  Work with a dietitian to develop a meal plan that is right for you  · Exercise regularly  to help lower your blood sugar when it is high  It can also keep your blood sugar levels steady over time  Exercise for at least 30 minutes, 5 days a week   Include muscle strengthening activities 2 days each week  Do not sit for longer than 90 minutes at a time  Work with your healthcare provider to create an exercise plan  Children should get at least 60 minutes of physical activity each day  · Check your ketones before exercise  if your blood sugar level is above 240 mg/dl  Do not exercise if you have ketones in your urine, because your blood sugar level may rise even more  Ask your healthcare provider how to lower your blood sugar when you have ketones  Follow up with your healthcare provider as directed:  Write down your questions so you remember to ask them during your visits  © 2017 [employer] Information is for End User's use only and may not be sold, redistributed or otherwise used for commercial purposes  All illustrations and images included in CareNotes® are the copyrighted property of [employer]  or [employer]  The above information is an  only  It is not intended as medical advice for individual conditions or treatments  Talk to your doctor, nurse or pharmacist before following any medical regimen to see if it is safe and effective for you

## 2019-08-02 NOTE — ED PROVIDER NOTES
History  Chief Complaint   Patient presents with    Hyperglycemia - no symptoms     patient states her family made her come in, because her sugar was >500  Patient took insulin before coming, about 20 units  Patient offers no complaints and states "I feel fine"  69 y/o female presents with no symptoms, but checked her blood sugar which was >400 at home  Said she took her insulin today, and has been compliant  Denies any change in diet, no dysuria, urgency or frequency, no chest pain, dyspnea  No cough,fevers,chills or any other symptoms  History provided by:  Patient   used: No        Prior to Admission Medications   Prescriptions Last Dose Informant Patient Reported? Taking?    Ascorbic Acid (VITAMIN C) 100 MG tablet   Yes No   Sig: Take by mouth   BASAGLAR KWIKPEN 100 units/mL injection pen   Yes No   Cholecalciferol 1000 units CHEW   Yes No   Sig: Chew 2,000 Units   FUROSEMIDE PO   Yes No   Sig: take 2 milliliter by oral route  every day   PARoxetine (PAXIL) 10 mg tablet   Yes No   Sig: take 1 tablet by oral route  every day   PARoxetine (PAXIL) 20 mg tablet   Yes No   PARoxetine (PAXIL) 40 MG tablet   Yes No   Sig: Take 1 tablet by mouth daily   Vitamin E 400 units TABS   Yes No   Sig: Take by mouth   aspirin 81 MG tablet   Yes No   Sig: Take 1 tablet by mouth 2 (two) times a day   aspirin 81 MG tablet   Yes No   Sig: take 1 tablet by oral route  every day   aspirin 81 mg chewable tablet   Yes No   Sig: Chew 81 mg   carvedilol (COREG) 25 mg tablet   Yes No   Sig: Take 1 tablet by mouth daily   carvedilol (COREG) 25 mg tablet   Yes No   Sig: Take 25 mg by mouth   cholecalciferol (VITAMIN D3) 1,000 units tablet   Yes No   Sig: Take by mouth   clopidogrel (PLAVIX) 300 mg   Yes No   Sig: Take by mouth   cyanocobalamin (VITAMIN B-12) 100 mcg tablet   Yes No   Sig: Take by mouth   furosemide (LASIX) 40 mg tablet   Yes No   Sig: Take 1 tablet by mouth every other day   furosemide (LASIX) 40 mg tablet   Yes No   Sig: Take 40 mg by mouth   gabapentin (NEURONTIN) 100 mg capsule   No No   Sig: Take 1 capsule (100 mg total) by mouth 3 (three) times a day   gabapentin (NEURONTIN) 100 mg capsule   No No   Sig: Take 3 capsules (300 mg total) by mouth 2 (two) times a day for 30 days   gabapentin (NEURONTIN) 300 mg capsule   No No   Sig: Take 1 capsule (300 mg total) by mouth 2 (two) times a day for 30 days   gabapentin (NEURONTIN) 600 MG tablet   Yes No   insulin aspart (NOVOLOG FLEXPEN) 100 Units/mL injection pen   Yes No   Sig: inject by subcutaneous route per prescriber&#39;s instructions  Insulin dosing requires individualization     insulin glargine (LANTUS) 100 units/mL subcutaneous injection   Yes No   Sig: inject by subcutaneous route as per insulin protocol   lisinopril (ZESTRIL) 20 mg tablet   Yes No   Sig: Take 1 tablet by mouth daily   lisinopril (ZESTRIL) 20 mg tablet   Yes No   Sig: Take 20 mg by mouth   metFORMIN (GLUCOPHAGE) 500 mg tablet   Yes No   Sig: Take 1 tablet by mouth daily   metFORMIN (GLUCOPHAGE) 500 mg tablet   Yes No   Sig: take 1 tablet by oral route 2 times every day with morning and evening meals   pravastatin (PRAVACHOL) 40 mg tablet   Yes No   Sig: Take 1 tablet by mouth   pravastatin (PRAVACHOL) 40 mg tablet   Yes No   Sig: Take 40 mg by mouth   ranitidine (ZANTAC) 150 mg tablet   Yes No   Sig: Take 150 mg by mouth   repaglinide (PRANDIN) 1 mg tablet   Yes No   Sig: Take 1 tab by mouth before each meal   traMADol (ULTRAM) 50 mg tablet   Yes No   Sig: Take 50 mg by mouth every 8 (eight) hours      Facility-Administered Medications: None       Past Medical History:   Diagnosis Date    CAD (coronary artery disease)     Diabetes mellitus ([facility] [address] )     Hyperlipidemia     Hypertension     Skin cancer        Past Surgical History:   Procedure Laterality Date    CHOLECYSTECTOMY      CORONARY ARTERY BYPASS GRAFT         Family History   Problem Relation Age of Onset    Arthritis Mother     Cancer Mother     Diabetes Father      I have reviewed and agree with the history as documented  Social History     Tobacco Use    Smoking status: Former Smoker     Types: Cigarettes    Smokeless tobacco: Never Used   Substance Use Topics    Alcohol use: Not Currently    Drug use: Never        Review of Systems   All other systems reviewed and are negative  Physical Exam  Physical Exam   Constitutional: She is oriented to person, place, and time  She appears well-developed and well-nourished  HENT:   Head: Normocephalic and atraumatic  Eyes: Pupils are equal, round, and reactive to light  EOM are normal    Neck: Normal range of motion  Neck supple  Cardiovascular: Normal rate and regular rhythm  Pulmonary/Chest: Effort normal and breath sounds normal    Abdominal: Soft  Bowel sounds are normal    Musculoskeletal: Normal range of motion  Neurological: She is alert and oriented to person, place, and time  Skin: Skin is warm and dry  Psychiatric: She has a normal mood and affect  Nursing note and vitals reviewed        Vital Signs  ED Triage Vitals   Temperature Pulse Respirations Blood Pressure SpO2   08/02/19 1437 08/02/19 1437 08/02/19 1437 08/02/19 1441 08/02/19 1437   98 4 °F (36 9 °C) 64 18 (!) 198/79 99 %      Temp Source Heart Rate Source Patient Position - Orthostatic VS BP Location FiO2 (%)   08/02/19 1437 08/02/19 1437 08/02/19 1437 08/02/19 1437 --   Oral Monitor Sitting Right arm       Pain Score       08/02/19 1437       No Pain           Vitals:    08/02/19 1437 08/02/19 1441 08/02/19 1514 08/02/19 1630   BP:  (!) 198/79 (!) 173/93 (!) 175/72   Pulse: 64  65 60   Patient Position - Orthostatic VS: Sitting  Lying Sitting         Visual Acuity      ED Medications  Medications   sodium chloride 0 9 % bolus 1,000 mL (0 mL Intravenous Stopped 8/2/19 1608)   insulin regular (HumuLIN R,NovoLIN R) injection 10 Units (10 Units Subcutaneous Given 8/2/19 1616) Diagnostic Studies  Results Reviewed     Procedure Component Value Units Date/Time    Fingerstick Glucose (POCT) [[id number]]  (Abnormal) Collected:  08/02/19 1612    Lab Status:  Final result Updated:  08/02/19 1617     POC Glucose 385 mg/dl     Comprehensive metabolic panel [[id number]]  (Abnormal) Collected:  08/02/19 1454    Lab Status:  Final result Specimen:  Blood from Arm, Left Updated:  08/02/19 1538     Sodium 130 mmol/L      Potassium 4 0 mmol/L      Chloride 95 mmol/L      CO2 24 mmol/L      ANION GAP 11 mmol/L      BUN 16 mg/dL      Creatinine 1 75 mg/dL      Glucose 466 mg/dL      Calcium 8 7 mg/dL      AST 8 U/L      ALT 25 U/L      Alkaline Phosphatase 101 U/L      Total Protein 6 3 g/dL      Albumin 3 3 g/dL      Total Bilirubin 0 30 mg/dL      eGFR 29 ml/min/1 73sq m     Narrative:       National Kidney Disease Foundation guidelines for Chronic Kidney Disease (CKD):     Stage 1 with normal or high GFR (GFR > 90 mL/min/1 73 square meters)    Stage 2 Mild CKD (GFR = 60-89 mL/min/1 73 square meters)    Stage 3A Moderate CKD (GFR = 45-59 mL/min/1 73 square meters)    Stage 3B Moderate CKD (GFR = 30-44 mL/min/1 73 square meters)    Stage 4 Severe CKD (GFR = 15-29 mL/min/1 73 square meters)    Stage 5 End Stage CKD (GFR <15 mL/min/1 73 square meters)  Note: GFR calculation is accurate only with a steady state creatinine    Magnesium [[id number]]  (Normal) Collected:  08/02/19 1454    Lab Status:  Final result Specimen:  Blood from Arm, Left Updated:  08/02/19 1538     Magnesium 2 0 mg/dL     Beta Hydroxybutyrate [[id number]]  (Normal) Collected:  08/02/19 1454    Lab Status:  Final result Specimen:  Blood from Arm, Left Updated:  08/02/19 1514     BETA-HYDROXYBUTYRATE 0 1 mmol/L     Blood gas, venous [[id number]]  (Abnormal) Collected:  08/02/19 1454    Lab Status:  Final result Specimen:  Blood from Arm, Left Updated:  08/02/19 1507     pH, Addison 7 310     pCO2, Addison 48 7 mm Hg      pO2, Addison 49 4 mm Hg      HCO3, Addison 24 0 mmol/L      Base Excess, Addison -2 5 mmol/L      O2 Content, Addison 13 6 ml/dL      O2 HGB, VENOUS 81 8 %     CBC and differential [[id number]]  (Abnormal) Collected:  08/02/19 1454    Lab Status:  Final result Specimen:  Blood from Arm, Left Updated:  08/02/19 1505     WBC 6 59 Thousand/uL      RBC 3 64 Million/uL      Hemoglobin 10 7 g/dL      Hematocrit 33 1 %      MCV 91 fL      MCH 29 4 pg      MCHC 32 3 g/dL      RDW 13 4 %      MPV 10 2 fL      Platelets 511 Thousands/uL      nRBC 0 /100 WBCs      Neutrophils Relative 57 %      Immat GRANS % 0 %      Lymphocytes Relative 30 %      Monocytes Relative 9 %      Eosinophils Relative 3 %      Basophils Relative 1 %      Neutrophils Absolute 3 81 Thousands/µL      Immature Grans Absolute 0 02 Thousand/uL      Lymphocytes Absolute 1 95 Thousands/µL      Monocytes Absolute 0 58 Thousand/µL      Eosinophils Absolute 0 19 Thousand/µL      Basophils Absolute 0 04 Thousands/µL     Fingerstick Glucose (POCT) [[id number]]  (Abnormal) Collected:  08/02/19 1436    Lab Status:  Final result Updated:  08/02/19 1439     POC Glucose 467 mg/dl                  No orders to display              Procedures  ECG 12 Lead Documentation Only  Performed by: Dottie Willis DO  Authorized by: Dottie Willis DO     ECG reviewed by me, the ED Provider: yes    Patient location:  ED  Previous ECG:     Previous ECG:  Unavailable    Comparison to cardiac monitor: Yes    Interpretation:     Interpretation: non-specific    Rate:     ECG rate assessment: normal    Rhythm:     Rhythm: sinus rhythm    Ectopy:     Ectopy: none    QRS:     QRS axis:  Normal  Conduction:     Conduction: normal    ST segments:     ST segments:  Non-specific  T waves:     T waves: non-specific             ED Course                               MDM  Number of Diagnoses or Management Options  Diabetes ([facility] [address] ):    Hyperglycemia:   Diagnosis management comments: Patient given a L of IV fluids and 10 units of subcutaneous insulin and her blood sugar improved to 300  She remained asymptomatic in the ED  I spoke with her family doctor Dr Jamaal albarado who checked and told me that her baseline creatinine has been in the 1 8 for the past few months  She is due to see a nephrologist for CKD stage 3  No changes to her insulin and she will follow up with her family doctor  Patient evaluated with labs EKG  I reviewed the results and discussed them with the patient  Patient discharged with appropriate instructions and follow-up  Patient verbalized understanding had no further questions at the time of discharge  Patient had stable vital signs and well-appearing at the time of discharge  Amount and/or Complexity of Data Reviewed  Clinical lab tests: ordered and reviewed  Tests in the medicine section of CPT®: ordered and reviewed    Patient Progress  Patient progress: stable      Disposition  Final diagnoses:   Hyperglycemia   Diabetes ([address] )     Time reflects when diagnosis was documented in both MDM as applicable and the Disposition within this note     Time User Action Codes Description Comment    8/2/2019  4:16 PM AnepuAlexa Add [R73 9] Hyperglycemia     8/2/2019  4:16 PM Ynes Lau Add [E11 9] Diabetes [facility])       ED Disposition     ED Disposition Condition Date/Time Comment    Discharge Stable Fri Aug 2, 2019  4:16 PM Magdy Crumbly discharge to home/self care              Follow-up Information     Follow up With Specialties Details Why Contact Info Additional Information    Bharati Marti MD Internal Medicine Schedule an appointment as soon as possible for a visit   [address] [place] [address]        [facility] Emergency Department Emergency Medicine  If symptoms worsen [address] [place] [address]  [phone] [facility], [facility], [id number]          Discharge Medication List as of 8/2/2019  4:17 PM      CONTINUE these medications which have NOT CHANGED    Details   Ascorbic Acid (VITAMIN C) 100 MG tablet Take by mouth, Historical Med      aspirin 81 mg chewable tablet Chew 81 mg, Historical Med      !! aspirin 81 MG tablet Take 1 tablet by mouth 2 (two) times a day, Historical Med      !! aspirin 81 MG tablet take 1 tablet by oral route  every day, Historical Med      BASAGLAR KWIKPEN 100 units/mL injection pen Starting Mon 4/16/2018, Historical Med      !! carvedilol (COREG) 25 mg tablet Take 1 tablet by mouth daily, Historical Med      !! carvedilol (COREG) 25 mg tablet Take 25 mg by mouth, Starting Tue 5/30/2017, Historical Med      cholecalciferol (VITAMIN D3) 1,000 units tablet Take by mouth, Historical Med      Cholecalciferol 1000 units CHEW Chew 2,000 Units, Historical Med      clopidogrel (PLAVIX) 300 mg Take by mouth, Historical Med      cyanocobalamin (VITAMIN B-12) 100 mcg tablet Take by mouth, Historical Med      !! furosemide (LASIX) 40 mg tablet Take 1 tablet by mouth every other day, Historical Med      !! furosemide (LASIX) 40 mg tablet Take 40 mg by mouth, Starting Thu 8/31/2017, Historical Med      !! FUROSEMIDE PO take 2 milliliter by oral route  every day, Historical Med      gabapentin (NEURONTIN) 100 mg capsule Take 1 capsule (100 mg total) by mouth 3 (three) times a day, Starting Mon 7/23/2018, Normal      gabapentin (NEURONTIN) 600 MG tablet Starting Sun 3/4/2018, Historical Med      insulin aspart (NOVOLOG FLEXPEN) 100 Units/mL injection pen inject by subcutaneous route per prescriber's instructions  Insulin dosing requires individualization  , Historical Med      insulin glargine (LANTUS) 100 units/mL subcutaneous injection inject by subcutaneous route as per insulin protocol, Historical Med      !! lisinopril (ZESTRIL) 20 mg tablet Take 1 tablet by mouth daily, Historical Med      !! lisinopril (ZESTRIL) 20 mg tablet Take 20 mg by mouth, Starting Fri 8/11/2017, Historical Med      !! metFORMIN (GLUCOPHAGE) 500 mg tablet Take 1 tablet by mouth daily, Historical Med      !! metFORMIN (GLUCOPHAGE) 500 mg tablet take 1 tablet by oral route 2 times every day with morning and evening meals, Historical Med      !! PARoxetine (PAXIL) 10 mg tablet take 1 tablet by oral route  every day, Historical Med      !! PARoxetine (PAXIL) 20 mg tablet Starting Thu 5/10/2018, Historical Med      !! PARoxetine (PAXIL) 40 MG tablet Take 1 tablet by mouth daily, Historical Med      !! pravastatin (PRAVACHOL) 40 mg tablet Take 1 tablet by mouth, Historical Med      !! pravastatin (PRAVACHOL) 40 mg tablet Take 40 mg by mouth, Starting Fri 3/24/2017, Historical Med      ranitidine (ZANTAC) 150 mg tablet Take 150 mg by mouth, Historical Med      repaglinide (PRANDIN) 1 mg tablet Take 1 tab by mouth before each meal, Historical Med      traMADol (ULTRAM) 50 mg tablet Take 50 mg by mouth every 8 (eight) hours, Starting u 2/22/2018, Historical Med      Vitamin E 400 units TABS Take by mouth, Historical Med       !! - Potential duplicate medications found  Please discuss with provider  No discharge procedures on file      ED Provider  Electronically Signed by           Martha Krishna DO  08/02/19 2034

## 2019-08-06 LAB
ATRIAL RATE: 59 BPM
P AXIS: 68 DEGREES
PR INTERVAL: 152 MS
QRS AXIS: 11 DEGREES
QRSD INTERVAL: 80 MS
QT INTERVAL: 448 MS
QTC INTERVAL: 443 MS
T WAVE AXIS: 136 DEGREES
VENTRICULAR RATE: 59 BPM

## 2019-08-06 PROCEDURE — 93010 ELECTROCARDIOGRAM REPORT: CPT | Performed by: INTERNAL MEDICINE

## 2019-09-28 ENCOUNTER — HOSPITAL ENCOUNTER (INPATIENT)
Facility: HOSPITAL | Age: 74
LOS: 3 days | Discharge: NURSING, REHAB OR LONG-TERM CARE | DRG: 637 | End: 2019-10-01
Attending: EMERGENCY MEDICINE | Admitting: FAMILY MEDICINE
Payer: MEDICARE

## 2019-09-28 ENCOUNTER — APPOINTMENT (EMERGENCY)
Dept: RADIOLOGY | Facility: HOSPITAL | Age: 74
DRG: 637 | End: 2019-09-28
Payer: MEDICARE

## 2019-09-28 DIAGNOSIS — Z79.4 TYPE 2 DIABETES MELLITUS WITH HYPERGLYCEMIA, WITH LONG-TERM CURRENT USE OF INSULIN (HCC): ICD-10-CM

## 2019-09-28 DIAGNOSIS — F32.A DEPRESSION: ICD-10-CM

## 2019-09-28 DIAGNOSIS — N17.9 AKI (ACUTE KIDNEY INJURY) (HCC): ICD-10-CM

## 2019-09-28 DIAGNOSIS — E11.65 TYPE 2 DIABETES MELLITUS WITH HYPERGLYCEMIA, WITH LONG-TERM CURRENT USE OF INSULIN (HCC): ICD-10-CM

## 2019-09-28 DIAGNOSIS — I73.9 PAD (PERIPHERAL ARTERY DISEASE) (HCC): ICD-10-CM

## 2019-09-28 DIAGNOSIS — H66.91 RIGHT OTITIS MEDIA: ICD-10-CM

## 2019-09-28 DIAGNOSIS — IMO0002 UNCONTROLLED DIABETES MELLITUS: ICD-10-CM

## 2019-09-28 DIAGNOSIS — R53.1 GENERALIZED WEAKNESS: ICD-10-CM

## 2019-09-28 DIAGNOSIS — R73.9 HYPERGLYCEMIA: Primary | ICD-10-CM

## 2019-09-28 PROBLEM — E87.1 HYPONATREMIA: Status: ACTIVE | Noted: 2019-09-28

## 2019-09-28 PROBLEM — I10 HYPERTENSION: Status: ACTIVE | Noted: 2019-09-28

## 2019-09-28 LAB
ALBUMIN SERPL BCP-MCNC: 3.2 G/DL (ref 3.5–5)
ALP SERPL-CCNC: 101 U/L (ref 46–116)
ALT SERPL W P-5'-P-CCNC: 26 U/L (ref 12–78)
AMPHETAMINES SERPL QL SCN: NEGATIVE
ANION GAP SERPL CALCULATED.3IONS-SCNC: 10 MMOL/L (ref 4–13)
APTT PPP: 29 SECONDS (ref 23–37)
AST SERPL W P-5'-P-CCNC: 12 U/L (ref 5–45)
ATRIAL RATE: 74 BPM
BACTERIA UR QL AUTO: ABNORMAL /HPF
BARBITURATES UR QL: NEGATIVE
BASE EX.OXY STD BLDV CALC-SCNC: 76.9 % (ref 60–80)
BASE EXCESS BLDV CALC-SCNC: -6.1 MMOL/L
BASOPHILS # BLD AUTO: 0.06 THOUSANDS/ΜL (ref 0–0.1)
BASOPHILS NFR BLD AUTO: 1 % (ref 0–1)
BENZODIAZ UR QL: NEGATIVE
BETA-HYDROXYBUTYRATE: 0.4 MMOL/L
BILIRUB SERPL-MCNC: 0.3 MG/DL (ref 0.2–1)
BILIRUB UR QL STRIP: NEGATIVE
BUN SERPL-MCNC: 31 MG/DL (ref 5–25)
CALCIUM SERPL-MCNC: 8.2 MG/DL (ref 8.3–10.1)
CHLORIDE SERPL-SCNC: 90 MMOL/L (ref 100–108)
CLARITY UR: CLEAR
CO2 SERPL-SCNC: 23 MMOL/L (ref 21–32)
COCAINE UR QL: NEGATIVE
COLOR UR: ABNORMAL
CREAT SERPL-MCNC: 2.42 MG/DL (ref 0.6–1.3)
EOSINOPHIL # BLD AUTO: 0.28 THOUSAND/ΜL (ref 0–0.61)
EOSINOPHIL NFR BLD AUTO: 4 % (ref 0–6)
ERYTHROCYTE [DISTWIDTH] IN BLOOD BY AUTOMATED COUNT: 12.7 % (ref 11.6–15.1)
GFR SERPL CREATININE-BSD FRML MDRD: 19 ML/MIN/1.73SQ M
GLUCOSE SERPL-MCNC: 157 MG/DL (ref 65–140)
GLUCOSE SERPL-MCNC: 171 MG/DL (ref 65–140)
GLUCOSE SERPL-MCNC: 206 MG/DL (ref 65–140)
GLUCOSE SERPL-MCNC: 339 MG/DL (ref 65–140)
GLUCOSE SERPL-MCNC: 66 MG/DL (ref 65–140)
GLUCOSE SERPL-MCNC: 728 MG/DL (ref 65–140)
GLUCOSE SERPL-MCNC: >500 MG/DL (ref 65–140)
GLUCOSE UR STRIP-MCNC: ABNORMAL MG/DL
HCO3 BLDV-SCNC: 20.4 MMOL/L (ref 24–30)
HCT VFR BLD AUTO: 38.3 % (ref 34.8–46.1)
HGB BLD-MCNC: 12.5 G/DL (ref 11.5–15.4)
HGB UR QL STRIP.AUTO: NEGATIVE
IMM GRANULOCYTES # BLD AUTO: 0.02 THOUSAND/UL (ref 0–0.2)
IMM GRANULOCYTES NFR BLD AUTO: 0 % (ref 0–2)
INR PPP: 0.99 (ref 0.91–1.09)
KETONES UR STRIP-MCNC: NEGATIVE MG/DL
LACTATE SERPL-SCNC: 1 MMOL/L (ref 0.5–2)
LEUKOCYTE ESTERASE UR QL STRIP: ABNORMAL
LYMPHOCYTES # BLD AUTO: 1.61 THOUSANDS/ΜL (ref 0.6–4.47)
LYMPHOCYTES NFR BLD AUTO: 25 % (ref 14–44)
MAGNESIUM SERPL-MCNC: 2.3 MG/DL (ref 1.6–2.6)
MCH RBC QN AUTO: 29.2 PG (ref 26.8–34.3)
MCHC RBC AUTO-ENTMCNC: 32.6 G/DL (ref 31.4–37.4)
MCV RBC AUTO: 90 FL (ref 82–98)
METHADONE UR QL: NEGATIVE
MONOCYTES # BLD AUTO: 0.36 THOUSAND/ΜL (ref 0.17–1.22)
MONOCYTES NFR BLD AUTO: 6 % (ref 4–12)
NEUTROPHILS # BLD AUTO: 4.02 THOUSANDS/ΜL (ref 1.85–7.62)
NEUTS SEG NFR BLD AUTO: 64 % (ref 43–75)
NITRITE UR QL STRIP: NEGATIVE
NON-SQ EPI CELLS URNS QL MICRO: ABNORMAL /HPF
NRBC BLD AUTO-RTO: 0 /100 WBCS
O2 CT BLDV-SCNC: 14.6 ML/DL
OPIATES UR QL SCN: NEGATIVE
OTHER STN SPEC: ABNORMAL
P AXIS: 90 DEGREES
PCO2 BLDV: 44 MM HG (ref 42–50)
PCP UR QL: NEGATIVE
PH BLDV: 7.29 [PH] (ref 7.3–7.4)
PH UR STRIP.AUTO: 5.5 [PH]
PHOSPHATE SERPL-MCNC: 4.6 MG/DL (ref 2.3–4.1)
PLATELET # BLD AUTO: 234 THOUSANDS/UL (ref 149–390)
PMV BLD AUTO: 10.7 FL (ref 8.9–12.7)
PO2 BLDV: 42.3 MM HG (ref 35–45)
POTASSIUM SERPL-SCNC: 4.2 MMOL/L (ref 3.5–5.3)
PR INTERVAL: 188 MS
PROT SERPL-MCNC: 6.5 G/DL (ref 6.4–8.2)
PROT UR STRIP-MCNC: NEGATIVE MG/DL
PROTHROMBIN TIME: 10.7 SECONDS (ref 9.8–12)
QRS AXIS: 31 DEGREES
QRSD INTERVAL: 86 MS
QT INTERVAL: 408 MS
QTC INTERVAL: 452 MS
RBC # BLD AUTO: 4.28 MILLION/UL (ref 3.81–5.12)
RBC #/AREA URNS AUTO: ABNORMAL /HPF
SODIUM SERPL-SCNC: 123 MMOL/L (ref 136–145)
SP GR UR STRIP.AUTO: <=1.005 (ref 1–1.03)
T WAVE AXIS: 178 DEGREES
THC UR QL: NEGATIVE
TROPONIN I SERPL-MCNC: <0.02 NG/ML
TSH SERPL DL<=0.05 MIU/L-ACNC: 1.61 UIU/ML (ref 0.36–3.74)
UROBILINOGEN UR QL STRIP.AUTO: 0.2 E.U./DL
VENTRICULAR RATE: 74 BPM
WBC # BLD AUTO: 6.35 THOUSAND/UL (ref 4.31–10.16)
WBC #/AREA URNS AUTO: ABNORMAL /HPF
WBC CLUMPS # UR AUTO: PRESENT /UL

## 2019-09-28 PROCEDURE — 82948 REAGENT STRIP/BLOOD GLUCOSE: CPT

## 2019-09-28 PROCEDURE — 99223 1ST HOSP IP/OBS HIGH 75: CPT | Performed by: FAMILY MEDICINE

## 2019-09-28 PROCEDURE — 87086 URINE CULTURE/COLONY COUNT: CPT | Performed by: EMERGENCY MEDICINE

## 2019-09-28 PROCEDURE — 93010 ELECTROCARDIOGRAM REPORT: CPT | Performed by: INTERNAL MEDICINE

## 2019-09-28 PROCEDURE — 85025 COMPLETE CBC W/AUTO DIFF WBC: CPT | Performed by: EMERGENCY MEDICINE

## 2019-09-28 PROCEDURE — 96365 THER/PROPH/DIAG IV INF INIT: CPT

## 2019-09-28 PROCEDURE — 82805 BLOOD GASES W/O2 SATURATION: CPT | Performed by: EMERGENCY MEDICINE

## 2019-09-28 PROCEDURE — 96375 TX/PRO/DX INJ NEW DRUG ADDON: CPT

## 2019-09-28 PROCEDURE — 81001 URINALYSIS AUTO W/SCOPE: CPT | Performed by: EMERGENCY MEDICINE

## 2019-09-28 PROCEDURE — 87147 CULTURE TYPE IMMUNOLOGIC: CPT | Performed by: FAMILY MEDICINE

## 2019-09-28 PROCEDURE — 71045 X-RAY EXAM CHEST 1 VIEW: CPT

## 2019-09-28 PROCEDURE — 1124F ACP DISCUSS-NO DSCNMKR DOCD: CPT | Performed by: INTERNAL MEDICINE

## 2019-09-28 PROCEDURE — 96361 HYDRATE IV INFUSION ADD-ON: CPT

## 2019-09-28 PROCEDURE — 83605 ASSAY OF LACTIC ACID: CPT | Performed by: EMERGENCY MEDICINE

## 2019-09-28 PROCEDURE — 80053 COMPREHEN METABOLIC PANEL: CPT | Performed by: EMERGENCY MEDICINE

## 2019-09-28 PROCEDURE — 84100 ASSAY OF PHOSPHORUS: CPT | Performed by: EMERGENCY MEDICINE

## 2019-09-28 PROCEDURE — 84443 ASSAY THYROID STIM HORMONE: CPT | Performed by: EMERGENCY MEDICINE

## 2019-09-28 PROCEDURE — 84484 ASSAY OF TROPONIN QUANT: CPT | Performed by: EMERGENCY MEDICINE

## 2019-09-28 PROCEDURE — 93005 ELECTROCARDIOGRAM TRACING: CPT

## 2019-09-28 PROCEDURE — 80307 DRUG TEST PRSMV CHEM ANLYZR: CPT | Performed by: EMERGENCY MEDICINE

## 2019-09-28 PROCEDURE — 36415 COLL VENOUS BLD VENIPUNCTURE: CPT | Performed by: EMERGENCY MEDICINE

## 2019-09-28 PROCEDURE — 83735 ASSAY OF MAGNESIUM: CPT | Performed by: EMERGENCY MEDICINE

## 2019-09-28 PROCEDURE — 70450 CT HEAD/BRAIN W/O DYE: CPT

## 2019-09-28 PROCEDURE — 87081 CULTURE SCREEN ONLY: CPT | Performed by: FAMILY MEDICINE

## 2019-09-28 PROCEDURE — 85610 PROTHROMBIN TIME: CPT | Performed by: EMERGENCY MEDICINE

## 2019-09-28 PROCEDURE — 85730 THROMBOPLASTIN TIME PARTIAL: CPT | Performed by: EMERGENCY MEDICINE

## 2019-09-28 PROCEDURE — 82010 KETONE BODYS QUAN: CPT | Performed by: EMERGENCY MEDICINE

## 2019-09-28 PROCEDURE — 99285 EMERGENCY DEPT VISIT HI MDM: CPT

## 2019-09-28 RX ORDER — CEFTRIAXONE 1 G/50ML
1000 INJECTION, SOLUTION INTRAVENOUS EVERY 24 HOURS
Status: DISCONTINUED | OUTPATIENT
Start: 2019-09-29 | End: 2019-10-01

## 2019-09-28 RX ORDER — ACETAMINOPHEN 325 MG/1
650 TABLET ORAL ONCE
Status: COMPLETED | OUTPATIENT
Start: 2019-09-28 | End: 2019-09-28

## 2019-09-28 RX ORDER — ASPIRIN 81 MG/1
81 TABLET, CHEWABLE ORAL 2 TIMES DAILY
Status: DISCONTINUED | OUTPATIENT
Start: 2019-09-28 | End: 2019-10-02 | Stop reason: HOSPADM

## 2019-09-28 RX ORDER — OFLOXACIN 3 MG/ML
5 SOLUTION AURICULAR (OTIC) 2 TIMES DAILY
COMMUNITY

## 2019-09-28 RX ORDER — SODIUM CHLORIDE 9 MG/ML
75 INJECTION, SOLUTION INTRAVENOUS CONTINUOUS
Status: DISCONTINUED | OUTPATIENT
Start: 2019-09-28 | End: 2019-09-30

## 2019-09-28 RX ORDER — DOXAZOSIN MESYLATE 4 MG/1
4 TABLET ORAL
COMMUNITY

## 2019-09-28 RX ORDER — ATORVASTATIN CALCIUM 40 MG/1
40 TABLET, FILM COATED ORAL
COMMUNITY

## 2019-09-28 RX ORDER — CEFTRIAXONE 1 G/50ML
1000 INJECTION, SOLUTION INTRAVENOUS ONCE
Status: COMPLETED | OUTPATIENT
Start: 2019-09-28 | End: 2019-09-28

## 2019-09-28 RX ORDER — MELATONIN
1000 DAILY
Status: DISCONTINUED | OUTPATIENT
Start: 2019-09-29 | End: 2019-10-02 | Stop reason: HOSPADM

## 2019-09-28 RX ORDER — PAROXETINE HYDROCHLORIDE 20 MG/1
20 TABLET, FILM COATED ORAL DAILY
Status: DISCONTINUED | OUTPATIENT
Start: 2019-09-29 | End: 2019-10-02 | Stop reason: HOSPADM

## 2019-09-28 RX ORDER — GABAPENTIN 300 MG/1
300 CAPSULE ORAL 2 TIMES DAILY
Status: DISCONTINUED | OUTPATIENT
Start: 2019-09-28 | End: 2019-10-02 | Stop reason: HOSPADM

## 2019-09-28 RX ORDER — ATORVASTATIN CALCIUM 40 MG/1
40 TABLET, FILM COATED ORAL
Status: DISCONTINUED | OUTPATIENT
Start: 2019-09-28 | End: 2019-10-02 | Stop reason: HOSPADM

## 2019-09-28 RX ORDER — CARVEDILOL 25 MG/1
25 TABLET ORAL 2 TIMES DAILY WITH MEALS
Status: DISCONTINUED | OUTPATIENT
Start: 2019-09-28 | End: 2019-10-02 | Stop reason: HOSPADM

## 2019-09-28 RX ORDER — OFLOXACIN 3 MG/ML
5 SOLUTION AURICULAR (OTIC) 2 TIMES DAILY
Status: DISCONTINUED | OUTPATIENT
Start: 2019-09-28 | End: 2019-10-02 | Stop reason: HOSPADM

## 2019-09-28 RX ORDER — ONDANSETRON 2 MG/ML
4 INJECTION INTRAMUSCULAR; INTRAVENOUS EVERY 6 HOURS PRN
Status: DISCONTINUED | OUTPATIENT
Start: 2019-09-28 | End: 2019-10-02 | Stop reason: HOSPADM

## 2019-09-28 RX ORDER — SULFAMETHOXAZOLE AND TRIMETHOPRIM 800; 160 MG/1; MG/1
1 TABLET ORAL EVERY 12 HOURS SCHEDULED
COMMUNITY
End: 2019-10-01 | Stop reason: HOSPADM

## 2019-09-28 RX ORDER — DOXAZOSIN 2 MG/1
4 TABLET ORAL
Status: DISCONTINUED | OUTPATIENT
Start: 2019-09-28 | End: 2019-10-02 | Stop reason: HOSPADM

## 2019-09-28 RX ADMIN — GABAPENTIN 300 MG: 300 CAPSULE ORAL at 18:31

## 2019-09-28 RX ADMIN — SODIUM CHLORIDE 100 ML/HR: 0.9 INJECTION, SOLUTION INTRAVENOUS at 18:48

## 2019-09-28 RX ADMIN — DOXAZOSIN 4 MG: 2 TABLET ORAL at 22:04

## 2019-09-28 RX ADMIN — SODIUM CHLORIDE 10 UNITS/HR: 9 INJECTION, SOLUTION INTRAVENOUS at 14:34

## 2019-09-28 RX ADMIN — SODIUM CHLORIDE 1000 ML: 0.9 INJECTION, SOLUTION INTRAVENOUS at 13:36

## 2019-09-28 RX ADMIN — ATORVASTATIN CALCIUM 40 MG: 40 TABLET, FILM COATED ORAL at 22:04

## 2019-09-28 RX ADMIN — CARVEDILOL 25 MG: 25 TABLET, FILM COATED ORAL at 18:31

## 2019-09-28 RX ADMIN — INSULIN LISPRO 1 UNITS: 100 INJECTION, SOLUTION INTRAVENOUS; SUBCUTANEOUS at 22:02

## 2019-09-28 RX ADMIN — CEFTRIAXONE 1000 MG: 1 INJECTION, SOLUTION INTRAVENOUS at 12:01

## 2019-09-28 RX ADMIN — SODIUM CHLORIDE 1000 ML: 0.9 INJECTION, SOLUTION INTRAVENOUS at 11:58

## 2019-09-28 RX ADMIN — ACETAMINOPHEN 650 MG: 325 TABLET, FILM COATED ORAL at 16:52

## 2019-09-28 RX ADMIN — INSULIN HUMAN 10 UNITS: 100 INJECTION, SOLUTION PARENTERAL at 13:53

## 2019-09-28 RX ADMIN — ASPIRIN 81 MG 81 MG: 81 TABLET ORAL at 18:31

## 2019-09-28 RX ADMIN — APIXABAN 5 MG: 5 TABLET, FILM COATED ORAL at 18:31

## 2019-09-28 NOTE — H&P
History and Physical - [facility]    Patient Information: Siva Brochure 68 y o  female MRN: [id number]  Unit/Bed#: [address]  Encounter: [id number]  Admitting Physician: Gilford Isaac, DO  PCP: Tisha Mcconnell MD  Date of Admission:  09/28/19        Hospital Problem List:     Principal Problem:    Type 2 diabetes mellitus with hyperglycemia, with long-term current use of insulin ([place] [address] )  Active Problems:    MATTI (acute kidney injury) ([place] [address] )    Right otitis media    Hyponatremia    Generalized weakness    Essential hypertension    PAD (peripheral artery disease) ([facility])      Assessment/Plan:    * Type 2 diabetes mellitus with hyperglycemia, with long-term current use of insulin ([place] [address] )  Assessment & Plan  No results found for: HGBA1C    Patient's blood sugar noted to be 728 in the ER  She received 10 units regular insulin  Uncontrolled blood sugars likely due to noncompliance  Admit patient for further management  Discontinue home medications  Patient started on insulin drip  Monitor blood sugars q 2 hours  Patient not in DKA  Check hemoglobin A1c level in a m  Hydration with IV fluids  Endocrinology consult        MATTI (acute kidney injury) [facility])  Assessment & Plan  Likely pre renal in nature  Hydration with IV fluids and get repeat lab work in a m  Patient is on Lasix p r n  At home which will be held    Hyponatremia  Assessment & Plan  Pseudohyponatremia from hyperglycemia  Corrected sodium 135 6  Repeat lab work in a       Right otitis media  Assessment & Plan  Patient received a dose of IV Rocephin in the ER which will be continued  Discontinue Bactrim  Continue otic drops    PAD (peripheral artery disease) ([facility])  Assessment & Plan  As per her sister's, patient has stents in place which have  "closed" as patient is noncompliant with her medications  Continue Eliquis, aspirin, statin    Essential hypertension  Assessment & Plan  Continue Coreg, Cardura and monitor blood pressures    Generalized weakness  Assessment & Plan  Likely due to deconditioning, weight loss, hyperglycemia  PT/OT while here          VTE Prophylaxis: Apixaban (Eliquis)  / sequential compression device   Code Status: Level 1 - Full Code    Anticipated Length of Stay:  Patient will be admitted on an Inpatient basis with an anticipated length of stay of atleast 2 midnights  Justification for Hospital Stay:     Total Time for Visit, including Counseling / Coordination of Care: 45 minutes  Greater than 50% of this total time spent on direct patient counseling and coordination of care  Chief Complaint:     Hyperglycemia - Symptomatic (pt presents to the ed from pcp for bs >800  pt states she feels generalized weakness )    of Present Illness:    Jany Car is a 68 y o  female who presents with hyperglycemia  Patients brother lives with her however he works a lot and is not able to take care of her  As per sisters, patient is not compliant with her medications  Patient noted to have 15 lb weight loss over the past few months  Sister states that patient is not cooking for herself and therefore does not eat much  Patient also with generalized weakness, weight loss, declining mental status over the last few weeks  She was seen by her primary care doctor yesterday and started on Bactrim for right otitis media  Lab work was done and since her blood sugar was noted to be greater than 800 she was asked to come to the ER  Patient reports some productive cough    Review of Systems:    Review of Systems   Constitutional: Positive for appetite change and unexpected weight change  Negative for chills and fever  HENT: Positive for ear pain  Negative for sore throat and trouble swallowing  Eyes: Negative for photophobia and visual disturbance  Respiratory: Positive for cough  Negative for chest tightness and shortness of breath  Cardiovascular: Negative for chest pain     Gastrointestinal: Negative for abdominal pain, blood in stool, diarrhea, nausea and vomiting  Genitourinary: Negative for dysuria, frequency and hematuria  Musculoskeletal: Negative for back pain  Skin: Negative for wound  Neurological: Positive for weakness  Negative for syncope, speech difficulty and headaches  Hematological: Does not bruise/bleed easily  Psychiatric/Behavioral: Positive for confusion  Past Medical and Surgical History:     Past Medical History:   Diagnosis Date    CAD (coronary artery disease)     Diabetes mellitus ([facility] [address] )     Hyperlipidemia     Hypertension     Skin cancer        Past Surgical History:   Procedure Laterality Date    CHOLECYSTECTOMY      CORONARY ARTERY BYPASS GRAFT      SPINAL FUSION      VASCULAR SURGERY Bilateral     Stent in legs       Meds/Allergies:    PTA meds:   Prior to Admission Medications   Prescriptions Last Dose Informant Patient Reported? Taking?    Ascorbic Acid (VITAMIN C) 100 MG tablet Unknown at Unknown time  Yes No   Sig: Take 500 mg by mouth daily    PARoxetine (PAXIL) 10 mg tablet Unknown at Unknown time  Yes No   Sig: Take 20 mg by mouth daily    Vitamin E 400 units TABS Unknown at Unknown time  Yes No   Sig: Take 400 Units by mouth daily    aspirin 81 mg chewable tablet Unknown at Unknown time  Yes No   Sig: Chew 81 mg 2 (two) times a day    atorvastatin (LIPITOR) 40 mg tablet Unknown at Unknown time  Yes No   Sig: Take 40 mg by mouth daily at bedtime   carvedilol (COREG) 25 mg tablet Unknown at Unknown time  Yes No   Sig: Take 25 mg by mouth 2 (two) times a day with meals    cholecalciferol (VITAMIN D3) 1,000 units tablet Unknown at Unknown time  Yes No   Sig: Take 1,000 Units by mouth daily    cyanocobalamin (VITAMIN B-12) 100 mcg tablet Unknown at Unknown time  Yes No   Sig: Take 1,000 mcg by mouth daily    doxazosin (CARDURA) 4 mg tablet Unknown at Unknown time  Yes No   Sig: Take 4 mg by mouth daily at bedtime   furosemide (LASIX) 40 mg tablet Unknown at Unknown time  Yes No   Sig: Take 40 mg by mouth daily as needed (edema)    gabapentin (NEURONTIN) 100 mg capsule Unknown at Unknown time  No No   Sig: Take 1 capsule (100 mg total) by mouth 3 (three) times a day   Patient taking differently: Take 300 mg by mouth 2 (two) times a day    insulin aspart (NOVOLOG FLEXPEN) 100 Units/mL injection pen Unknown at Unknown time  Yes No   Sig: Inject 14 Units under the skin 2 (two) times a day before meals    ofloxacin (FLOXIN) 0 3 % otic solution   Yes Yes   Sig: Administer 5 drops to the right ear 2 (two) times a day   ranitidine (ZANTAC) 150 mg tablet Unknown at Unknown time  Yes No   Sig: Take 150 mg by mouth daily at bedtime as needed for heartburn    sulfamethoxazole-trimethoprim (BACTRIM DS) 800-160 mg per tablet 9/27/2019 at 2100  Yes Yes   Sig: Take 1 tablet by mouth every 12 (twelve) hours      Facility-Administered Medications: None       Allergies: Allergies   Allergen Reactions    Penicillins Hives     History:     Marital Status:      Substance Use History:   Social History     Substance and Sexual Activity   Alcohol Use Never    Frequency: Never     Social History     Tobacco Use   Smoking Status Former Smoker    Types: Cigarettes   Smokeless Tobacco Never Used     Social History     Substance and Sexual Activity   Drug Use Never       Family History:    Family History   Problem Relation Age of Onset    Arthritis Mother     Cancer Mother     Diabetes Father     Stroke Father     Heart disease Father        Physical Exam:     Vitals:   Blood Pressure: 124/62 (09/28/19 1651)  Pulse: 72 (09/28/19 1445)  Temperature: 97 7 °F (36 5 °C) (09/28/19 1651)  Respirations: (!) 25 (09/28/19 1445)  Height: 5' (152 4 cm) (09/28/19 1625)  Weight - Scale: 61 2 kg (135 lb) (09/28/19 1625)  SpO2: 98 % (09/28/19 1445)    Physical Exam   Constitutional: She is oriented to person, place, and time  She appears well-developed  No distress     HENT:   Head: Normocephalic and atraumatic  Dry mucous membranes  Ear exam done by ER physician showed erythematous, boggy tympanic membrane of right ear with purulent effusion   Eyes: EOM are normal  Right eye exhibits no discharge  Left eye exhibits no discharge  No scleral icterus  Neck: Neck supple  Cardiovascular: Normal rate and regular rhythm  Pulmonary/Chest: Effort normal and breath sounds normal  No respiratory distress  She has no wheezes  She has no rales  Abdominal: Soft  Bowel sounds are normal  She exhibits no distension  There is no tenderness  Musculoskeletal: She exhibits no edema  Neurological: She is alert and oriented to person, place, and time  No cranial nerve deficit  Moves all extremities equally   Skin: Skin is dry  She is not diaphoretic  Lab Results: I have personally reviewed pertinent reports  Results from last 7 days   Lab Units 09/28/19  1129   WBC Thousand/uL 6 35   HEMOGLOBIN g/dL 12 5   HEMATOCRIT % 38 3   PLATELETS Thousands/uL 234   NEUTROS PCT % 64   LYMPHS PCT % 25   MONOS PCT % 6   EOS PCT % 4     Results from last 7 days   Lab Units 09/28/19  1129   POTASSIUM mmol/L 4 2   CHLORIDE mmol/L 90*   CO2 mmol/L 23   BUN mg/dL 31*   CREATININE mg/dL 2 42*   CALCIUM mg/dL 8 2*   ALK PHOS U/L 101   ALT U/L 26   AST U/L 12     Results from last 7 days   Lab Units 09/28/19  1129   INR  0 99       Imaging: I have personally reviewed pertinent reports  Xr Chest 1 View Portable    Result Date: 9/28/2019  Narrative: CHEST INDICATION:   hyperglycemia  COMPARISON:  January 13, 2014 EXAM PERFORMED/VIEWS:  XR CHEST PORTABLE 1 FINDINGS:  There are fractures of the wires through the manubrium  These were fractured previously  The caudal-most sternal wire is also fractured  This is unchanged  There is a stent across the left coronary system  This is seen in fairly good detail  Was well seen previously  This would imply decreased cardiac motion   Cardiomediastinal silhouette appears unremarkable  The lungs are clear  No pneumothorax or pleural effusion  Osseous structures appear within normal limits for patient age  Impression: Suspect decreased contractility Workstation performed: [id number]     Ct Head Without Contrast    Result Date: 9/28/2019  Narrative: CT BRAIN - WITHOUT CONTRAST INDICATION:   change in ms  COMPARISON:  10/8/2015 TECHNIQUE:  CT examination of the brain was performed  In addition to axial images, coronal 2D reformatted images were created and submitted for interpretation  Radiation dose length product (DLP) for this visit:  972 mGy-cm   This examination, like all CT scans performed in the [facility], was performed utilizing techniques to minimize radiation dose exposure, including the use of iterative reconstruction and automated exposure control  IMAGE QUALITY:  Diagnostic  FINDINGS: PARENCHYMA: Decreased attenuation is noted in periventricular and subcortical white matter demonstrating an appearance that is statistically most likely to represent mild microangiopathic change; this appearance is similar when compared to most recent prior examination  No CT signs of acute infarction  No intracranial mass, mass effect or midline shift  No acute parenchymal hemorrhage  VENTRICLES AND EXTRA-AXIAL SPACES:  Ventricles and extra-axial CSF spaces are prominent commensurate with the degree of volume loss  No hydrocephalus  No acute extra-axial hemorrhage  VISUALIZED ORBITS AND PARANASAL SINUSES:  Unremarkable  CALVARIUM AND EXTRACRANIAL SOFT TISSUES:  Normal      Impression: No acute intracranial abnormality  Microangiopathic changes  Workstation performed: [id number]       CT head without contrast   Final Result      No acute intracranial abnormality  Microangiopathic changes                    Workstation performed: [id number]         XR chest 1 view portable   Final Result      Suspect decreased contractility            Workstation performed: [id number]             EKG, Pathology, and Other Studies Reviewed on Admission:   · EKG shows sinus rhythm with no acute ST elevations    Allscripts/EPIC Records Reviewed: Yes     ** Please Note: "This note has been constructed using a voice recognition system  Therefore there may be syntax, spelling, and/or grammatical errors   Please call if you have any questions  "**

## 2019-09-28 NOTE — PLAN OF CARE
Problem: Potential for Falls  Goal: Patient will remain free of falls  Description  INTERVENTIONS:  - Assess patient frequently for physical needs  -  Identify cognitive and physical deficits and behaviors that affect risk of falls  -  [place] fall precautions as indicated by assessment   - Educate patient/family on patient safety including physical limitations  - Instruct patient to call for assistance with activity based on assessment  - Modify environment to reduce risk of injury  Outcome: Progressing     Problem: Nutrition/Hydration-ADULT  Goal: Nutrient/Hydration intake appropriate for improving, restoring or maintaining nutritional needs  Description  Monitor and assess patient's nutrition/hydration status for malnutrition  Collaborate with interdisciplinary team and initiate plan and interventions as ordered  Monitor patient's weight and dietary intake as ordered or per policy  Utilize nutrition screening tool and intervene as necessary  Determine patient's food preferences and provide high-protein, high-caloric foods as appropriate       INTERVENTIONS:  - Monitor oral intake, urinary output, labs, and treatment plans  - Assess nutrition and hydration status and recommend course of action  - Evaluate amount of meals eaten  - Assist patient with eating if necessary   - Allow adequate time for meals  - Recommend/ encourage appropriate diets, oral nutritional supplements, and vitamin/mineral supplements  - Provide specific nutrition/hydration education as appropriate  - Include patient/family/caregiver in decisions related to nutrition  Outcome: Progressing

## 2019-09-28 NOTE — ED PROVIDER NOTES
History  Chief Complaint   Patient presents with    Hyperglycemia - Symptomatic     pt presents to the ed from pcp for bs >800  pt states she feels generalized weakness      78-year-old female with past medical history of insulin-dependent diabetes, tobacco abuse, CAD, hyperlipidemia, hypertension, presents to the ER for evaluation of declining mental status, generalized weakness, and weight loss over the past few weeks  Patient lives with her brother however brother is not able to take care of her as he works a lot  Patient's sisters live nearby  Sisters are concerned that patient is not taking care of herself  Sisters are also concerned that patient is not taking her meds correctly  Patient noted to have 15 lb weight loss over the past few months  Sisters states that patient is not eating and cooking for herself  At this time sisters are looking for nursing home placement as they think that she is no longer able to take care herself and is no longer safe at home  Patient has no other children or living relatives  Sisters and brother make decision for patient  During interview patient is alert and oriented x3 however she appears to have generalized weakness  Patient was seen by her PCP yesterday and had stat labs ordered  Patient was also placed on Bactrim for right otitis media  Patient's outpatient BMP showed blood glucose level over 800  Patient was subsequently sent to the ED for further evaluation  Patient states that she has had congestion, cold symptoms, cough productive of white sputum over the past 1 week  Patient denies any difficulty breathing, fevers, chills at home        History provided by:  Patient  Hyperglycemia - Symptomatic   Associated symptoms: weakness    Associated symptoms: no abdominal pain, no chest pain, no confusion, no dizziness, no dysuria, no fever, no nausea and no shortness of breath        Prior to Admission Medications   Prescriptions Last Dose Informant Patient Reported? Taking? Ascorbic Acid (VITAMIN C) 100 MG tablet   Yes No   Sig: Take by mouth   BASAGLAR KWIKPEN 100 units/mL injection pen   Yes No   Cholecalciferol 1000 units CHEW   Yes No   Sig: Chew 2,000 Units   FUROSEMIDE PO   Yes No   Sig: take 2 milliliter by oral route  every day   PARoxetine (PAXIL) 10 mg tablet   Yes No   Sig: take 1 tablet by oral route  every day   PARoxetine (PAXIL) 20 mg tablet   Yes No   PARoxetine (PAXIL) 40 MG tablet   Yes No   Sig: Take 1 tablet by mouth daily   Vitamin E 400 units TABS   Yes No   Sig: Take by mouth   aspirin 81 MG tablet   Yes No   Sig: Take 1 tablet by mouth 2 (two) times a day   aspirin 81 MG tablet   Yes No   Sig: take 1 tablet by oral route  every day   aspirin 81 mg chewable tablet   Yes No   Sig: Chew 81 mg   carvedilol (COREG) 25 mg tablet   Yes No   Sig: Take 1 tablet by mouth daily   carvedilol (COREG) 25 mg tablet   Yes No   Sig: Take 25 mg by mouth   cholecalciferol (VITAMIN D3) 1,000 units tablet   Yes No   Sig: Take by mouth   clopidogrel (PLAVIX) 300 mg   Yes No   Sig: Take by mouth   cyanocobalamin (VITAMIN B-12) 100 mcg tablet   Yes No   Sig: Take by mouth   furosemide (LASIX) 40 mg tablet   Yes No   Sig: Take 1 tablet by mouth every other day   furosemide (LASIX) 40 mg tablet   Yes No   Sig: Take 40 mg by mouth   gabapentin (NEURONTIN) 100 mg capsule   No No   Sig: Take 1 capsule (100 mg total) by mouth 3 (three) times a day   gabapentin (NEURONTIN) 100 mg capsule   No No   Sig: Take 3 capsules (300 mg total) by mouth 2 (two) times a day for 30 days   gabapentin (NEURONTIN) 300 mg capsule   No No   Sig: Take 1 capsule (300 mg total) by mouth 2 (two) times a day for 30 days   gabapentin (NEURONTIN) 600 MG tablet   Yes No   insulin aspart (NOVOLOG FLEXPEN) 100 Units/mL injection pen   Yes No   Sig: inject by subcutaneous route per prescriber&#39;s instructions  Insulin dosing requires individualization     insulin glargine (LANTUS) 100 units/mL subcutaneous injection   Yes No   Sig: inject by subcutaneous route as per insulin protocol   lisinopril (ZESTRIL) 20 mg tablet   Yes No   Sig: Take 1 tablet by mouth daily   lisinopril (ZESTRIL) 20 mg tablet   Yes No   Sig: Take 20 mg by mouth   metFORMIN (GLUCOPHAGE) 500 mg tablet   Yes No   Sig: Take 1 tablet by mouth daily   metFORMIN (GLUCOPHAGE) 500 mg tablet   Yes No   Sig: take 1 tablet by oral route 2 times every day with morning and evening meals   pravastatin (PRAVACHOL) 40 mg tablet   Yes No   Sig: Take 1 tablet by mouth   pravastatin (PRAVACHOL) 40 mg tablet   Yes No   Sig: Take 40 mg by mouth   ranitidine (ZANTAC) 150 mg tablet   Yes No   Sig: Take 150 mg by mouth   repaglinide (PRANDIN) 1 mg tablet   Yes No   Sig: Take 1 tab by mouth before each meal   traMADol (ULTRAM) 50 mg tablet   Yes No   Sig: Take 50 mg by mouth every 8 (eight) hours      Facility-Administered Medications: None       Past Medical History:   Diagnosis Date    CAD (coronary artery disease)     [facility]     Hyperlipidemia     Hypertension     Skin cancer        Past Surgical History:   Procedure Laterality Date    CHOLECYSTECTOMY      CORONARY ARTERY BYPASS GRAFT         Family History   Problem Relation Age of Onset    Arthritis Mother     Cancer Mother     Diabetes Father      I have reviewed and agree with the history as documented  Social History     Tobacco Use    Smoking status: Former Smoker     Types: Cigarettes    Smokeless tobacco: Never Used   Substance Use Topics    Alcohol use: Not Currently    Drug use: Never        Review of Systems   Constitutional: Negative for activity change, appetite change, chills and fever  HENT: Negative for congestion and ear pain  Eyes: Negative for pain and discharge  Respiratory: Negative for cough, chest tightness, shortness of breath, wheezing and stridor  Cardiovascular: Negative for chest pain and palpitations     Gastrointestinal: Negative for abdominal distention, abdominal pain, constipation, diarrhea and nausea  Endocrine: Negative for cold intolerance  Genitourinary: Negative for dysuria, frequency and urgency  Hyperglycemia   Musculoskeletal: Negative for arthralgias and back pain  Skin: Negative for color change and rash  Allergic/Immunologic: Negative for environmental allergies and food allergies  Neurological: Positive for weakness  Negative for dizziness, numbness and headaches  Hematological: Negative for adenopathy  Psychiatric/Behavioral: Negative for agitation, behavioral problems and confusion  The patient is not nervous/anxious  All other systems reviewed and are negative  Physical Exam  Physical Exam   Constitutional: She is oriented to person, place, and time  She appears well-developed and well-nourished  HENT:   Head: Normocephalic and atraumatic  Left Ear: External ear normal    Mouth/Throat: Oropharynx is clear and moist    Erythematous, boggy tympanic membrane with purulent effusion noted  Eyes: Conjunctivae and EOM are normal    Neck: Normal range of motion  Neck supple  Cardiovascular: Normal rate, regular rhythm, normal heart sounds and intact distal pulses  Pulmonary/Chest: Effort normal and breath sounds normal    Lungs are clear to auscultation bilateral    Abdominal: Soft  Bowel sounds are normal  She exhibits no distension  There is no tenderness  Musculoskeletal: Normal range of motion  Neurological: She is alert and oriented to person, place, and time  Patient is alert and oriented x3  Visual field intact bilateral   Finger-to-nose intact bilateral   Heel-to-shin intact bilateral   Sensory intact bilateral   Generalized weakness noted without any focal neuro deficits   Skin: Skin is warm and dry  Psychiatric: She has a normal mood and affect  Her behavior is normal  Judgment and thought content normal    Nursing note and vitals reviewed        Vital Signs  ED Triage Vitals Temp Pulse Respirations Blood Pressure SpO2   -- 09/28/19 1104 09/28/19 1104 09/28/19 1104 09/28/19 1104    79 20 110/56 95 %      Temp src Heart Rate Source Patient Position - Orthostatic VS BP Location FiO2 (%)   -- 09/28/19 1104 09/28/19 1238 09/28/19 1238 --    Monitor Sitting Right arm       Pain Score       09/28/19 1238       No Pain           Vitals:    09/28/19 1104 09/28/19 1238   BP: 110/56 140/65   Pulse: 79 68   Patient Position - Orthostatic VS:  Sitting         Visual Acuity  Visual Acuity      Most Recent Value   L Pupil Size (mm)  2   R Pupil Size (mm)  2          ED Medications  Medications   sodium chloride 0 9 % bolus 1,000 mL (1,000 mL Intravenous New Bag 9/28/19 1336)   insulin regular (HumuLIN R,NovoLIN R) 1 Units/mL in sodium chloride 0 9 % 100 mL infusion (has no administration in time range)   sodium chloride 0 9 % bolus 1,000 mL (0 mL Intravenous Stopped 9/28/19 1335)   cefTRIAXone (ROCEPHIN) IVPB (premix) 1,000 mg (0 mg Intravenous Stopped 9/28/19 1241)   insulin regular (HumuLIN R,NovoLIN R) injection 10 Units (10 Units Intravenous Given 9/28/19 1353)       Diagnostic Studies  Results Reviewed     Procedure Component Value Units Date/Time    Fingerstick Glucose (POCT) [[id number]]  (Abnormal) Collected:  09/28/19 1325    Lab Status:  Final result Updated:  09/28/19 1326     POC Glucose >500 mg/dl     Lactic acid, plasma [[id number]]  (Normal) Collected:  09/28/19 1129    Lab Status:  Final result Specimen:  Blood from Arm, Right Updated:  09/28/19 1212     LACTIC ACID 1 0 mmol/L     Narrative:       Result may be elevated if tourniquet was used during collection      Comprehensive metabolic panel [[id number]]  (Abnormal) Collected:  09/28/19 1129    Lab Status:  Final result Specimen:  Blood from Arm, Right Updated:  09/28/19 1208     Sodium 123 mmol/L      Potassium 4 2 mmol/L      Chloride 90 mmol/L      CO2 23 mmol/L      ANION GAP 10 mmol/L      BUN 31 mg/dL      Creatinine 2 42 mg/dL Glucose 728 mg/dL      Calcium 8 2 mg/dL      AST 12 U/L      ALT 26 U/L      Alkaline Phosphatase 101 U/L      Total Protein 6 5 g/dL      Albumin 3 2 g/dL      Total Bilirubin 0 30 mg/dL      eGFR 19 ml/min/1 73sq m     Narrative:       [place] guidelines for Chronic Kidney Disease (CKD):     Stage 1 with normal or high GFR (GFR > 90 mL/min/1 73 square meters)    Stage 2 Mild CKD (GFR = 60-89 mL/min/1 73 square meters)    Stage 3A Moderate CKD (GFR = 45-59 mL/min/1 73 square meters)    Stage 3B Moderate CKD (GFR = 30-44 mL/min/1 73 square meters)    Stage 4 Severe CKD (GFR = 15-29 mL/min/1 73 square meters)    Stage 5 End Stage CKD (GFR <15 mL/min/1 73 square meters)  Note: GFR calculation is accurate only with a steady state creatinine    Magnesium [[id number]]  (Normal) Collected:  09/28/19 1129    Lab Status:  Final result Specimen:  Blood from Arm, Right Updated:  09/28/19 1207     Magnesium 2 3 mg/dL     Phosphorus [[id number]]  (Abnormal) Collected:  09/28/19 1129    Lab Status:  Final result Specimen:  Blood from Arm, Right Updated:  09/28/19 1207     Phosphorus 4 6 mg/dL     TSH, 3rd generation with Free T4 reflex [[id number]]  (Normal) Collected:  09/28/19 1129    Lab Status:  Final result Specimen:  Blood from Arm, Right Updated:  09/28/19 1207     TSH 3RD GENERATON 1 608 uIU/mL     Narrative:       Patients undergoing fluorescein dye angiography may retain small amounts of fluorescein in the body for 48-72 hours post procedure  Samples containing fluorescein can produce falsely depressed TSH values  If the patient had this procedure,a specimen should be resubmitted post fluorescein clearance        Nancy Verma [[id number]]  (Normal) Collected:  09/28/19 1129    Lab Status:  Final result Specimen:  Blood from Arm, Right Updated:  09/28/19 1204     Protime 10 7 seconds      INR 0 99    APTT [[id number]]  (Normal) Collected:  09/28/19 1129    Lab Status:  Final result Specimen:  Blood from Arm, Right Updated:  09/28/19 1204     PTT 29 seconds     Troponin I [[id number]]  (Normal) Collected:  09/28/19 1129    Lab Status:  Final result Specimen:  Blood from Arm, Right Updated:  09/28/19 1159     Troponin I <0 02 ng/mL     Beta Hydroxybutyrate [[id number]]  (Normal) Collected:  09/28/19 1129    Lab Status:  Final result Specimen:  Blood from Arm, Right Updated:  09/28/19 1145     BETA-HYDROXYBUTYRATE 0 4 mmol/L     CBC and differential [[id number]] Collected:  09/28/19 1129    Lab Status:  Final result Specimen:  Blood from Arm, Right Updated:  09/28/19 1138     WBC 6 35 Thousand/uL      RBC 4 28 Million/uL      Hemoglobin 12 5 g/dL      Hematocrit 38 3 %      MCV 90 fL      MCH 29 2 pg      MCHC 32 6 g/dL      RDW 12 7 %      MPV 10 7 fL      Platelets 919 Thousands/uL      nRBC 0 /100 WBCs      Neutrophils Relative 64 %      Immat GRANS % 0 %      Lymphocytes Relative 25 %      Monocytes Relative 6 %      Eosinophils Relative 4 %      Basophils Relative 1 %      Neutrophils Absolute 4 02 Thousands/µL      Immature Grans Absolute 0 02 Thousand/uL      Lymphocytes Absolute 1 61 Thousands/µL      Monocytes Absolute 0 36 Thousand/µL      Eosinophils Absolute 0 28 Thousand/µL      Basophils Absolute 0 06 Thousands/µL     Blood gas, venous [[id number]]  (Abnormal) Collected:  09/28/19 1129    Lab Status:  Final result Specimen:  Blood from Arm, Right Updated:  09/28/19 1137     pH, Addison 7 285     pCO2, Addison 44 0 mm Hg      pO2, Addison 42 3 mm Hg      HCO3, Addison 20 4 mmol/L      Base Excess, Addison -6 1 mmol/L      O2 Content, Addison 14 6 ml/dL      O2 HGB, VENOUS 76 9 %     UA w Reflex to Microscopic w Reflex to Culture [[id number]]     Lab Status:  No result Specimen:  Urine     Rapid drug screen, urine [[id number]]     Lab Status:  No result Specimen:  Urine     Fingerstick Glucose (POCT) [[id number]]  (Abnormal) Collected:  09/28/19 1109    Lab Status:  Final result Updated:  09/28/19 1110     POC Glucose >500 mg/dl                  CT head without contrast   Final Result by Kareem Gonzales MD (09/28 1230)      No acute intracranial abnormality  Microangiopathic changes  Workstation performed: [id number]         XR chest 1 view portable   Final Result by Gogo Mercer MD (09/28 1146)      Suspect decreased contractility            Workstation performed: [id number]                    Procedures  ECG 12 Lead Documentation Only  Date/Time: 9/28/2019 11:19 AM  Performed by: Agnes Escobar DO  Authorized by: Agnes Escobar DO     Indications / Diagnosis:  Weakness  ECG reviewed by me, the ED Provider: yes    Patient location:  ED  Previous ECG:     Previous ECG:  Compared to current    Similarity:  No change    Comparison to cardiac monitor: Yes    Interpretation:     Interpretation: abnormal    Comments:      74, normal axis, normal intervals, no acute ST elevations noted, T-wave inversions noted to lead 1, V4 through V6, lateral ischemia that is unchanged from previous study    Sinus rhythm           ED Course  ED Course as of Sep 28 1405   Sat Sep 28, 2019   1208 Corrected sodium is 133                                  MDM  Number of Diagnoses or Management Options  MATTI (acute kidney injury) [facility]): new and requires workup  Generalized weakness: new and requires workup  Hyperglycemia: new and requires workup  Uncontrolled diabetes mellitus [facility]): new and requires workup  Diagnosis management comments: Obtain fingerstick glucose  Obtain blood work including UA, CBC, CMP, lactic acid level, beta hydroxybutyrate, VBG, CT brain, chest x-ray  Give IV fluid       Amount and/or Complexity of Data Reviewed  Clinical lab tests: ordered and reviewed  Tests in the radiology section of CPT®: ordered and reviewed  Tests in the medicine section of CPT®: ordered and reviewed  Decide to obtain previous medical records or to obtain history from someone other than the patient: yes  Review and summarize past medical records: yes  Independent visualization of images, tracings, or specimens: yes    Risk of Complications, Morbidity, and/or Mortality  General comments: Patient's BMP shows glucose over 700  No DKA noted on the blood work  At this point patient's hyperglycemia may be secondary to medication noncompliance  Patient given 2 L of IV fluid in the ED as well as 10 units of IV insulin  Patient given Rocephin for right otitis media  Patient is admitted to General Medicine for further management  Admitting team will start non DKA insulin protocol  Patient and family agrees with admission plans  Patient Progress  Patient progress: stable      Disposition  Final diagnoses:   Hyperglycemia   Uncontrolled diabetes mellitus ([facility] 75 )   MATTI (acute kidney injury) ([place] [address] )   Generalized weakness   Right otitis media     Time reflects when diagnosis was documented in both MDM as applicable and the Disposition within this note     Time User Action Codes Description Comment    9/28/2019  2:02 PM [place] Form Add [R73 9] Hyperglycemia     9/28/2019  2:03 PM Timur Luke [E11 65] Uncontrolled diabetes mellitus ([place] [address] )     9/28/2019  2:03 PM Ferdous, Nayeli Parrot Add [N17 9] MATTI (acute kidney injury) ([facility] 75 )     9/28/2019  2:03 PM Ferdous, Nayeli Parrot Add [R53 1] Generalized weakness     9/28/2019  2:05 PM [place] Form Add [H66 91] Right otitis media       ED Disposition     ED Disposition Condition Date/Time Comment    Admit Stable Sat Sep 28, 2019  2:02 PM Case was discussed with Dr Joselito Chavez and the patient's admission status was agreed to be Admission Status: inpatient status to the service of Dr Joselito Chavez  Follow-up Information    None         Patient's Medications   Discharge Prescriptions    No medications on file     No discharge procedures on file      ED Provider  Electronically Signed by           Douglas Jimenez DO  09/28/19 1518

## 2019-09-28 NOTE — ED NOTES
Pt repoorts feeling thirsty and hungry  Pt requesting meal   Pt aware of NPO status  Pt aware of need for UA         Frankie Hinson RN  09/28/19 2792

## 2019-09-28 NOTE — ED NOTES
SUBJECTIVE:   Love Pinon is a 65 year old female who presents to clinic today for the following health issues:      Musculoskeletal problem/pain      Duration: 2 weeks - after getting flu shot , may few days     Description  Location: started in left shoulder , felt stiff and achy so she thought may be it was just flu shot, but now getting worse ,  now feels like other joints are feeling achy , affecting both shoulder, knees and lower back and neck ,dull pain , no red or swollen joints, no fever or chills. No other uri sx     Intensity:  mild, moderate,  worsens throughout the day     Accompanying signs and symptoms:   All other joint , stopped taking simvastatin , bc she was unsure if it is causing sx  . She has been  on simvastatin for 15+yr and had no previous  problem with that , but just was not sure .     History  Previous similar problem: no   Previous evaluation:  none    Precipitating or alleviating factors:  Trauma or overuse: no   Aggravating factors include: none    Therapies tried and outcome: acetaminophen pm       PROBLEMS TO ADD ON...  Has been over all more tired, achy although no fever or chills. Has gained weight.no other GI or urinary sx.    Problem list and histories reviewed & adjusted, as indicated.  Additional history: as documented    Patient Active Problem List   Diagnosis     HYPERLIPIDEMIA LDL GOAL <130     Advance care planning     BPPV (benign paroxysmal positional vertigo)     Migraine headache without aura     Hypertension goal BP (blood pressure) < 140/90     Seizure disorder (H)     Osteoporosis     Chronic idiopathic constipation     Hyponatremia     Past Surgical History:   Procedure Laterality Date     APPENDECTOMY  2007     CL AFF SURGICAL PATHOLOGY  1970    with cryotherapy     COLONOSCOPY  2005    polyp excision, repeat  5 years      COLONOSCOPY N/A 9/15/2016    3 adenoma polyps, repeat in 3 years      D & C       HYSTEROSCOPY       TUBAL LIGATION         Social  Dr Tony Mcgee aware of FS >500 in between liters of fluid  FS done per Dr Tony Black, ANNY  09/28/19 9202 "History   Substance Use Topics     Smoking status: Former Smoker     Years: 18.00     Types: Cigarettes     Quit date: 1989     Smokeless tobacco: Never Used     Alcohol use 1.8 oz/week     3 Standard drinks or equivalent per week      Comment: 2/week     Family History   Problem Relation Age of Onset     Cardiovascular Paternal Grandfather       of heart attack     Hypertension Father      Lipids Father      Cancer Father      MELANOMA     Genitourinary Problems Mother      Cancer Paternal Grandmother      Asthma Sister      Asthma Sister            Reviewed and updated as needed this visit by clinical staff       Reviewed and updated as needed this visit by Provider         ROS:  Constitutional, HEENT, cardiovascular, pulmonary, GI, , musculoskeletal, neuro, skin, endocrine and psych systems are negative, except as otherwise noted.    OBJECTIVE:     BP (!) 141/93  Pulse 64  Temp 98.5  F (36.9  C) (Tympanic)  Ht 5' 6\" (1.676 m)  Wt 158 lb (71.7 kg)  SpO2 100%  BMI 25.5 kg/m2  Body mass index is 25.5 kg/(m^2).  GENERAL: healthy, alert and no distress  EYES: Eyes grossly normal to inspection, PERRL and conjunctivae and sclerae normal  NECK: no adenopathy, no asymmetry, masses, or scars and thyroid normal to palpation  RESP: lungs clear to auscultation - no rales, rhonchi or wheezes  CV: regular rate and rhythm, normal S1 S2, no S3 or S4, no murmur  ABDOMEN: soft, nontender, no hepatosplenomegaly, no masses and bowel sounds normal  MS: shoulder with slight decrease of motion neck with normal range of motion and tenderness to palpation    SKIN: no suspicious lesions or rashes  NEURO: Normal strength and tone, mentation intact and speech normal  PSYCH: mentation appears normal, affect normal/bright      ASSESSMENT/PLAN:       (M79.10) Myalgia  Comment: I doubt it is related to statin , could be viral illness   Plan: CBC with platelets, Vitamin D Deficiency,         Comprehensive metabolic panel, CK " total,         Magnesium            (M25.512) Left shoulder pain, unspecified chronicity  (primary encounter diagnosis)  Comment:   Plan:    discussed shoulder cares and symptomatic treatment including  adequate pain control, heat,  stretches etc. Reassurance likely not related to statin.willing to try Relafen to help with pain. for now. she will do follow up if no improvement or problem. Consider further evaluation and  physical therapy if needed.               (R53.83) Other fatigue  Comment:   Plan: CBC with platelets, Vitamin D Deficiency,         Comprehensive metabolic panel, CK total,         Magnesium    Check labs. refill sent.Cares and  treatment discussed follow. up if problem   Patient expressed understanding and agreement with treatment plan. All patient's questions were answered, will let me know if has more later.  Medications: Rx's: Reviewed the potential side effects/complications of medications prescribed.       Bri House MD  [facility] [place]

## 2019-09-28 NOTE — ED NOTES
Pt c/o cramping to lower legs  Dr Verna Narvaez aware  Will give tylenol per Dr Verna Narvaez  Will recheck FS prior to sending to [facility]          Rhonda Sosa RN  09/28/19 8181

## 2019-09-29 PROBLEM — E11.65 TYPE 2 DIABETES MELLITUS WITH HYPERGLYCEMIA, WITH LONG-TERM CURRENT USE OF INSULIN (HCC): Status: ACTIVE | Noted: 2019-09-29

## 2019-09-29 PROBLEM — Z79.4 TYPE 2 DIABETES MELLITUS WITH HYPERGLYCEMIA, WITH LONG-TERM CURRENT USE OF INSULIN (HCC): Status: ACTIVE | Noted: 2019-09-29

## 2019-09-29 LAB
ANION GAP SERPL CALCULATED.3IONS-SCNC: 10 MMOL/L (ref 4–13)
BACTERIA UR CULT: NORMAL
BUN SERPL-MCNC: 26 MG/DL (ref 5–25)
CALCIUM SERPL-MCNC: 8 MG/DL (ref 8.3–10.1)
CHLORIDE SERPL-SCNC: 104 MMOL/L (ref 100–108)
CO2 SERPL-SCNC: 20 MMOL/L (ref 21–32)
CREAT SERPL-MCNC: 1.93 MG/DL (ref 0.6–1.3)
ERYTHROCYTE [DISTWIDTH] IN BLOOD BY AUTOMATED COUNT: 12.7 % (ref 11.6–15.1)
EST. AVERAGE GLUCOSE BLD GHB EST-MCNC: 306 MG/DL
GFR SERPL CREATININE-BSD FRML MDRD: 25 ML/MIN/1.73SQ M
GLUCOSE SERPL-MCNC: 190 MG/DL (ref 65–140)
GLUCOSE SERPL-MCNC: 212 MG/DL (ref 65–140)
GLUCOSE SERPL-MCNC: 237 MG/DL (ref 65–140)
GLUCOSE SERPL-MCNC: 242 MG/DL (ref 65–140)
GLUCOSE SERPL-MCNC: 244 MG/DL (ref 65–140)
GLUCOSE SERPL-MCNC: 262 MG/DL (ref 65–140)
GLUCOSE SERPL-MCNC: 273 MG/DL (ref 65–140)
GLUCOSE SERPL-MCNC: 453 MG/DL (ref 65–140)
GLUCOSE SERPL-MCNC: 488 MG/DL (ref 65–140)
HBA1C MFR BLD: 12.3 % (ref 4.2–6.3)
HCT VFR BLD AUTO: 34.1 % (ref 34.8–46.1)
HGB BLD-MCNC: 11 G/DL (ref 11.5–15.4)
MCH RBC QN AUTO: 29 PG (ref 26.8–34.3)
MCHC RBC AUTO-ENTMCNC: 32.3 G/DL (ref 31.4–37.4)
MCV RBC AUTO: 90 FL (ref 82–98)
PHOSPHATE SERPL-MCNC: 3.9 MG/DL (ref 2.3–4.1)
PLATELET # BLD AUTO: 226 THOUSANDS/UL (ref 149–390)
PMV BLD AUTO: 10.9 FL (ref 8.9–12.7)
POTASSIUM SERPL-SCNC: 3.6 MMOL/L (ref 3.5–5.3)
RBC # BLD AUTO: 3.79 MILLION/UL (ref 3.81–5.12)
SODIUM SERPL-SCNC: 134 MMOL/L (ref 136–145)
WBC # BLD AUTO: 7.94 THOUSAND/UL (ref 4.31–10.16)

## 2019-09-29 PROCEDURE — 80048 BASIC METABOLIC PNL TOTAL CA: CPT | Performed by: FAMILY MEDICINE

## 2019-09-29 PROCEDURE — 84100 ASSAY OF PHOSPHORUS: CPT | Performed by: FAMILY MEDICINE

## 2019-09-29 PROCEDURE — 82948 REAGENT STRIP/BLOOD GLUCOSE: CPT

## 2019-09-29 PROCEDURE — 85027 COMPLETE CBC AUTOMATED: CPT | Performed by: FAMILY MEDICINE

## 2019-09-29 PROCEDURE — 99232 SBSQ HOSP IP/OBS MODERATE 35: CPT | Performed by: FAMILY MEDICINE

## 2019-09-29 PROCEDURE — 83036 HEMOGLOBIN GLYCOSYLATED A1C: CPT | Performed by: FAMILY MEDICINE

## 2019-09-29 RX ORDER — INSULIN GLARGINE 100 [IU]/ML
15 INJECTION, SOLUTION SUBCUTANEOUS
Status: DISCONTINUED | OUTPATIENT
Start: 2019-09-29 | End: 2019-09-30

## 2019-09-29 RX ADMIN — CYANOCOBALAMIN TAB 500 MCG 1000 MCG: 500 TAB at 08:21

## 2019-09-29 RX ADMIN — INSULIN LISPRO 5 UNITS: 100 INJECTION, SOLUTION INTRAVENOUS; SUBCUTANEOUS at 16:39

## 2019-09-29 RX ADMIN — INSULIN LISPRO 2 UNITS: 100 INJECTION, SOLUTION INTRAVENOUS; SUBCUTANEOUS at 21:38

## 2019-09-29 RX ADMIN — INSULIN LISPRO 2 UNITS: 100 INJECTION, SOLUTION INTRAVENOUS; SUBCUTANEOUS at 08:20

## 2019-09-29 RX ADMIN — INSULIN LISPRO 5 UNITS: 100 INJECTION, SOLUTION INTRAVENOUS; SUBCUTANEOUS at 18:41

## 2019-09-29 RX ADMIN — APIXABAN 5 MG: 5 TABLET, FILM COATED ORAL at 18:11

## 2019-09-29 RX ADMIN — INSULIN LISPRO 5 UNITS: 100 INJECTION, SOLUTION INTRAVENOUS; SUBCUTANEOUS at 16:38

## 2019-09-29 RX ADMIN — GABAPENTIN 300 MG: 300 CAPSULE ORAL at 08:21

## 2019-09-29 RX ADMIN — SODIUM CHLORIDE 100 ML/HR: 0.9 INJECTION, SOLUTION INTRAVENOUS at 04:44

## 2019-09-29 RX ADMIN — SODIUM CHLORIDE 100 ML/HR: 0.9 INJECTION, SOLUTION INTRAVENOUS at 15:15

## 2019-09-29 RX ADMIN — CARVEDILOL 25 MG: 25 TABLET, FILM COATED ORAL at 16:40

## 2019-09-29 RX ADMIN — INSULIN LISPRO 5 UNITS: 100 INJECTION, SOLUTION INTRAVENOUS; SUBCUTANEOUS at 16:37

## 2019-09-29 RX ADMIN — CARVEDILOL 25 MG: 25 TABLET, FILM COATED ORAL at 07:45

## 2019-09-29 RX ADMIN — INSULIN GLARGINE 15 UNITS: 100 INJECTION, SOLUTION SUBCUTANEOUS at 21:38

## 2019-09-29 RX ADMIN — APIXABAN 5 MG: 5 TABLET, FILM COATED ORAL at 08:21

## 2019-09-29 RX ADMIN — ASPIRIN 81 MG 81 MG: 81 TABLET ORAL at 18:11

## 2019-09-29 RX ADMIN — ASPIRIN 81 MG 81 MG: 81 TABLET ORAL at 08:21

## 2019-09-29 RX ADMIN — GABAPENTIN 300 MG: 300 CAPSULE ORAL at 18:11

## 2019-09-29 RX ADMIN — DOXAZOSIN 4 MG: 2 TABLET ORAL at 21:41

## 2019-09-29 RX ADMIN — CEFTRIAXONE 1000 MG: 1 INJECTION, SOLUTION INTRAVENOUS at 11:39

## 2019-09-29 RX ADMIN — VITAMIN D, TAB 1000IU (100/BT) 1000 UNITS: 25 TAB at 08:21

## 2019-09-29 RX ADMIN — ATORVASTATIN CALCIUM 40 MG: 40 TABLET, FILM COATED ORAL at 21:38

## 2019-09-29 RX ADMIN — OFLOXACIN 5 DROP: 3 SOLUTION AURICULAR (OTIC) at 18:10

## 2019-09-29 RX ADMIN — INSULIN LISPRO 5 UNITS: 100 INJECTION, SOLUTION INTRAVENOUS; SUBCUTANEOUS at 11:46

## 2019-09-29 RX ADMIN — PAROXETINE 20 MG: 20 TABLET, FILM COATED ORAL at 08:21

## 2019-09-29 NOTE — PLAN OF CARE
Problem: Potential for Falls  Goal: Patient will remain free of falls  Description  INTERVENTIONS:  - Assess patient frequently for physical needs  -  Identify cognitive and physical deficits and behaviors that affect risk of falls  -  [place] fall precautions as indicated by assessment   - Educate patient/family on patient safety including physical limitations  - Instruct patient to call for assistance with activity based on assessment  - Modify environment to reduce risk of injury  - Consider OT/PT consult to assist with strengthening/mobility  Outcome: Progressing     Problem: Nutrition/Hydration-ADULT  Goal: Nutrient/Hydration intake appropriate for improving, restoring or maintaining nutritional needs  Description  Monitor and assess patient's nutrition/hydration status for malnutrition  Collaborate with interdisciplinary team and initiate plan and interventions as ordered  Monitor patient's weight and dietary intake as ordered or per policy  Utilize nutrition screening tool and intervene as necessary  Determine patient's food preferences and provide high-protein, high-caloric foods as appropriate       INTERVENTIONS:  - Monitor oral intake, urinary output, labs, and treatment plans  - Assess nutrition and hydration status and recommend course of action  - Evaluate amount of meals eaten  - Assist patient with eating if necessary   - Allow adequate time for meals  - Recommend/ encourage appropriate diets, oral nutritional supplements, and vitamin/mineral supplements  - Order, calculate, and assess calorie counts as needed  - Recommend, monitor, and adjust tube feedings and TPN/PPN based on assessed needs  - Assess need for intravenous fluids  - Provide specific nutrition/hydration education as appropriate  - Include patient/family/caregiver in decisions related to nutrition  Outcome: Progressing     Problem: METABOLIC, FLUID AND ELECTROLYTES - ADULT  Goal: Electrolytes maintained within normal limits  Description  INTERVENTIONS:  - Monitor labs and assess patient for signs and symptoms of electrolyte imbalances  - Administer electrolyte replacement as ordered  - Monitor response to electrolyte replacements, including repeat lab results as appropriate  - Instruct patient on fluid and nutrition as appropriate  Outcome: Progressing  Goal: Fluid balance maintained  Description  INTERVENTIONS:  - Monitor labs   - Monitor I/O and WT  - Instruct patient on fluid and nutrition as appropriate  - Assess for signs & symptoms of volume excess or deficit  Outcome: Progressing  Goal: Glucose maintained within target range  Description  INTERVENTIONS:  - Monitor Blood Glucose as ordered  - Assess for signs and symptoms of hyperglycemia and hypoglycemia  - Administer ordered medications to maintain glucose within target range  - Assess nutritional intake and initiate nutrition service referral as needed  Outcome: Progressing

## 2019-09-29 NOTE — ASSESSMENT & PLAN NOTE
Patient received a dose of IV Rocephin in the ER which will be continued  Patient received 3 days of Rocephin and was transitioned to cefdinir 100 milligram p o   Daily for 1 week  Continue ofloxacin ear drops

## 2019-09-29 NOTE — ASSESSMENT & PLAN NOTE
Lab Results   Component Value Date    HGBA1C 12 3 (H) 09/29/2019       Recent Labs     09/29/19  1634 09/29/19  2102 09/30/19  0718 09/30/19  1059   POCGLU 488* 262* 245* 375*     Patient's blood sugar noted to be 728 in the ER  She received 10 units regular insulin  Uncontrolled diabetes likely due to noncompliance  Discontinued home medications  Patient was started on insulin drip which was later discontinued as blood sugar went down to 66  Patient not in DKA  Sugars continue to remain uncontrolled  Lantus dose was increased to 25 units daily and Humalog was increased to 10 units with each meal   Patient will need close blood sugar monitoring with outpatient follow-up with Endocrinology

## 2019-09-29 NOTE — PHYSICIAN ADVISOR
Current patient class: Inpatient  The patient is currently on Hospital Day: 2 at [address] [place] [address]      The patient was admitted to the hospital at [phone] on 9/28/19 for the following diagnosis:  Hyperglycemia [R73 9]  Uncontrolled diabetes mellitus ([address] ) [E11 65]  Right otitis media [H66 91]  MATTI (acute kidney injury) ([facility] ) [N17 9]  Generalized weakness [R53 1]       There is documentation in the medical record of an expected length of stay of at least 2 midnights  The patient is therefore expected to satisfy the 2 midnight benchmark and given the 2 midnight presumption is appropriate for INPATIENT ADMISSION  Given this expectation of a satisfying stay, CMS instructs us that the patient is most often appropriate for inpatient admission under part A provided medical necessity is documented in the chart  After review of the relevant documentation, labs, vital signs and test results, the patient is appropriate for INPATIENT ADMISSION  Admission to the hospital as an inpatient is a complex decision making process which requires the practitioner to consider the patients presenting complaint, history and physical examination and all relevant testing  With this in mind, in this case, the patient was deemed appropriate for INPATIENT ADMISSION  After review of the documentation and testing available at the time of the admission I concur with this clinical determination of medical necessity  Rationale is as follows: The patient is a 68 yrs old Female who presented to the ED at 9/28/2019 11:01 AM with a chief complaint of Hyperglycemia - Symptomatic (pt presents to the ed from pcp for bs >800  pt states she feels generalized weakness )     The patient is being admitted with DM with hyperglycemia, MATTI, hyponatremia, right otitis media, generalized weakness  The plan of care includes IVF, endocrinology consultation, repeat labs, PT/OT consultation   This patient is appropriate for INPATIENT admission       The patients vitals on arrival were ED Triage Vitals   Temperature Pulse Respirations Blood Pressure SpO2   09/28/19 1651 09/28/19 1104 09/28/19 1104 09/28/19 1104 09/28/19 1104   97 7 °F (36 5 °C) 79 20 110/56 95 %      Temp Source Heart Rate Source Patient Position - Orthostatic VS BP Location FiO2 (%)   09/28/19 1651 09/28/19 1104 09/28/19 1238 09/28/19 1238 --   Oral Monitor Sitting Right arm       Pain Score       09/28/19 1238       No Pain           Past Medical History:   Diagnosis Date    CAD (coronary artery disease)     Diabetes mellitus ([facility] )     Hyperlipidemia     Hypertension     Skin cancer      Past Surgical History:   Procedure Laterality Date    CHOLECYSTECTOMY      CORONARY ARTERY BYPASS GRAFT      SPINAL FUSION      VASCULAR SURGERY Bilateral     Stent in legs           Consults have been placed to:   IP CONSULT TO ENDOCRINOLOGY    Vitals:    09/29/19 0336 09/29/19 0657 09/29/19 0746 09/29/19 1556   BP: 137/54 121/54  (!) 172/66   BP Location:  Left arm     Pulse: (!) 54 55 63 66   Resp: 18 18 19   Temp: 98 1 °F (36 7 °C) 97 5 °F (36 4 °C)  97 9 °F (36 6 °C)   TempSrc:  Oral     SpO2: 96% 96%  97%   Weight:       Height:           Most recent labs:    Recent Labs     09/28/19  1129 09/29/19  0450 09/29/19  0451   WBC 6 35  --  7 94   HGB 12 5  --  11 0*   HCT 38 3  --  34 1*     --  226   K 4 2 3 6  --    CALCIUM 8 2* 8 0*  --    BUN 31* 26*  --    CREATININE 2 42* 1 93*  --    INR 0 99  --   --    TROPONINI <0 02  --   --    AST 12  --   --    ALT 26  --   --    ALKPHOS 101  --   --        Scheduled Meds:  Current Facility-Administered Medications:  apixaban 5 mg Oral BID Lenore Revankar, DO    aspirin 81 mg Oral BID Lenore Revankar, DO    atorvastatin 40 mg Oral HS Lenore Revankar, DO    carvedilol 25 mg Oral BID With Meals Lenore Revankar, DO    cefTRIAXone 1,000 mg Intravenous Q24H Lenore Revankar, DO Last Rate: 1,000 mg (09/29/19 0985)   cholecalciferol 1,000 Units Oral Daily Lenore Revankar, DO    cyanocobalamin 1,000 mcg Oral Daily Lenore Revankar, DO    doxazosin 4 mg Oral HS Lenore Revankar, DO    gabapentin 300 mg Oral BID Lenore Revankar, DO    insulin glargine 15 Units Subcutaneous HS Lenore Revankar, DO    insulin lispro 1-5 Units Subcutaneous HS Corina Olguin MD    insulin lispro 1-5 Units Subcutaneous TID AC Lenore Revankar, DO    insulin lispro 5 Units Subcutaneous TID With Meals Lenore Revankar, DO    ofloxacin 5 drop Right Ear BID Lenore Revankar, DO    ondansetron 4 mg Intravenous Q6H PRN Lenore Revankar, DO    PARoxetine 20 mg Oral Daily Lenore Revankar, DO    sodium chloride 75 mL/hr Intravenous Continuous Lenore Revankar, DO Last Rate: 75 mL/hr (09/29/19 1838)     Continuous Infusions:  sodium chloride 75 mL/hr Last Rate: 75 mL/hr (09/29/19 1838)     PRN Meds: ondansetron    Surgical procedures (if appropriate):

## 2019-09-29 NOTE — PROGRESS NOTES
Pt presented with hyperglycemia (Glucose >500) and was started on an Insulin drip  Subsequent FS improved however it did rapidly decline to 66 therefore it was held  Her last 2 FS have been 157 and 171 respectively  BMP reveals normal anion gap and pt is tolerating her diet  Will discontinue Insulin drip and start on SSI  Will continue to check FS every 2 hours for now  Plan of care discussed with nursing staff

## 2019-09-29 NOTE — PROGRESS NOTES
[facility] Internal Medicine Progress Note  Patient: Jany Car 68 y o  female   MRN: [id number]  PCP: Kayla Brandon MD  Unit/Bed#: [address] [place] [address] Encounter: [id number]  Date Of Visit: 09/29/19    Problem List:    Principal Problem:    Type 2 diabetes mellitus with hyperglycemia, with long-term current use of insulin ([facility] )  Active Problems:    MATTI (acute kidney injury) ([facility] )    Right otitis media    Hyponatremia    Generalized weakness    Essential hypertension    PAD (peripheral artery disease) ([place] [address] )      Assessment & Plan:    * Type 2 diabetes mellitus with hyperglycemia, with long-term current use of insulin [facility])  Assessment & Plan  Lab Results   Component Value Date    HGBA1C 12 3 (H) 09/29/2019       Recent Labs     09/29/19  0623 09/29/19  0753 09/29/19  1140 09/29/19  1634   POCGLU 244* 273* 453* 488*     Patient's blood sugar noted to be 728 in the ER  She received 10 units regular insulin  Uncontrolled diabetes likely due to noncompliance  Discontinued home medications  Patient was started on insulin drip which was later discontinued as blood sugar went down to 66  Patient not in DKA  Continue Hydration with IV fluids  Endocrinology consult  Sugars are not well controlled  Discussed case with Endocrinology on call and patient started on 15 units of Lantus q h s  Along with 5 units Humalog scheduled  Increased to algorithm to sliding scale insulin with meals    MATTI (acute kidney injury) [facility])  Assessment & Plan  Likely pre renal in nature  Continue Hydration with IV fluids and get repeat lab work in a m  Patient is on Lasix p r n  At home which will be held    Hyponatremia  Assessment & Plan  Pseudohyponatremia from hyperglycemia  Repeat lab work in a m      Right otitis media  Assessment & Plan  Patient received a dose of IV Rocephin in the ER which will be continued  Discontinued Bactrim  Continue otic drops    PAD (peripheral artery disease) ([facility])  Assessment & Plan  As per her sisters, patient has stents in place which have  "closed" as patient is noncompliant with her medications  Continue Eliquis, aspirin, statin  Essential hypertension  Assessment & Plan  Continue Coreg, Cardura and monitor blood pressures  Generalized weakness  Assessment & Plan  Likely due to deconditioning, weight loss, hyperglycemia  PT/OT while here  VTE Pharmacologic Prophylaxis:   Pharmacologic: Apixaban (Eliquis)  Mechanical VTE Prophylaxis in Place: Yes    Patient Centered Rounds: I have performed bedside rounds with nursing staff today  Discussions with Specialists or Other Care Team Provider: yes    Education and Discussions with Family / Patient: yes    Time Spent for Care: 30 minutes  More than 50% of total time spent on counseling and coordination of care as described above  Current Length of Stay: 1 day(s)    Current Patient Status: Inpatient   Certification Statement: The patient will continue to require additional inpatient hospital stay due to Type 2 diabetes with persistent hyperglycemia, acute kidney injury    Discharge Plan: Pending    Code Status: Level 1 - Full Code      Subjective:   Patient states she feels tired  Appetite improving    Objective:     Vitals:   Temp (24hrs), Av 8 °F (36 6 °C), Min:97 5 °F (36 4 °C), Max:98 1 °F (36 7 °C)    Temp:  [97 5 °F (36 4 °C)-98 1 °F (36 7 °C)] 97 9 °F (36 6 °C)  HR:  [54-66] 66  Resp:  [16-19] 19  BP: (121-172)/(54-66) 172/66  SpO2:  [96 %-99 %] 97 %  Body mass index is 26 37 kg/m²  Input and Output Summary (last 24 hours): Intake/Output Summary (Last 24 hours) at 2019 1835  Last data filed at 2019 1556  Gross per 24 hour   Intake 1271 67 ml   Output 700 ml   Net 571 67 ml       Physical Exam:     Physical Exam   Constitutional: She is oriented to person, place, and time  She appears well-developed  No distress  HENT:   Head: Normocephalic and atraumatic     Dry mucous membranes   Eyes: EOM are normal  Right eye exhibits no discharge  Left eye exhibits no discharge  No scleral icterus  Cardiovascular: Normal rate and regular rhythm  Pulmonary/Chest: Effort normal and breath sounds normal  No respiratory distress  She has no wheezes  She has no rales  Abdominal: Soft  Bowel sounds are normal  She exhibits no distension  There is no tenderness  Musculoskeletal: She exhibits no edema  Neurological: She is alert and oriented to person, place, and time  No cranial nerve deficit  She exhibits normal muscle tone  Skin: She is not diaphoretic  Additional Data:     Labs:    Results from last 7 days   Lab Units 09/29/19  0451 09/28/19  1129   WBC Thousand/uL 7 94 6 35   HEMOGLOBIN g/dL 11 0* 12 5   HEMATOCRIT % 34 1* 38 3   PLATELETS Thousands/uL 226 234   NEUTROS PCT %  --  64   LYMPHS PCT %  --  25   MONOS PCT %  --  6   EOS PCT %  --  4     Results from last 7 days   Lab Units 09/29/19  0450 09/28/19  1129   POTASSIUM mmol/L 3 6 4 2   CHLORIDE mmol/L 104 90*   CO2 mmol/L 20* 23   BUN mg/dL 26* 31*   CREATININE mg/dL 1 93* 2 42*   CALCIUM mg/dL 8 0* 8 2*   ALK PHOS U/L  --  101   ALT U/L  --  26   AST U/L  --  12     Results from last 7 days   Lab Units 09/28/19  1129   INR  0 99       * I Have Reviewed All Lab Data Listed Above  * Additional Pertinent Lab Tests Reviewed:  Di [facility] Admission Reviewed      Imaging:  Imaging Reports Reviewed Today Include: CT head, CXR  Imaging Personally Reviewed by Myself Includes:  N/A    Recent Cultures (last 7 days):     Results from last 7 days   Lab Units 09/28/19  1406   URINE CULTURE  No Growth <1000 cfu/mL       Last 24 Hours Medication List:     Current Facility-Administered Medications:  apixaban 5 mg Oral BID Lenore Revankar, DO    aspirin 81 mg Oral BID Lenore Revankar, DO    atorvastatin 40 mg Oral HS Lenore Revankar, DO    carvedilol 25 mg Oral BID With Meals Lenore Revankar, DO    cefTRIAXone 1,000 mg Intravenous Q24H Lenore Revankar, DO Last Rate: 1,000 mg (09/29/19 1139)   cholecalciferol 1,000 Units Oral Daily Lenore Revankar, DO    cyanocobalamin 1,000 mcg Oral Daily Lenore Revankar, DO    doxazosin 4 mg Oral HS Lenore Revankar, DO    gabapentin 300 mg Oral BID Lenore Revankar, DO    insulin glargine 15 Units Subcutaneous HS Lenore Revankar, DO    insulin lispro 1-5 Units Subcutaneous HS Corina Olguin MD    insulin lispro 1-5 Units Subcutaneous TID AC Lenore Revankar, DO    insulin lispro 5 Units Subcutaneous TID With Meals Lenore Revankar, DO    ofloxacin 5 drop Right Ear BID Lenore Revankar, DO    ondansetron 4 mg Intravenous Q6H PRN Lenore Revankar, DO    PARoxetine 20 mg Oral Daily Lenore Revankar, DO    sodium chloride 100 mL/hr Intravenous Continuous Lenore Revankar, DO Last Rate: 100 mL/hr (09/29/19 7372)          Today, Patient Was Seen By: Georgia Pacheco DO    ** Please Note: "This note has been constructed using a voice recognition system  Therefore there may be syntax, spelling, and/or grammatical errors   Please call if you have any questions  "**

## 2019-09-29 NOTE — ASSESSMENT & PLAN NOTE
Likely pre renal in nature  Creatinine has improved  Creatinine has been stable  Patient received IV fluids before

## 2019-09-29 NOTE — ASSESSMENT & PLAN NOTE
Likely due to deconditioning, weight loss, hyperglycemia    Improving with PT/OT  Patient will be discharged to short-term rehabilitation

## 2019-09-29 NOTE — ASSESSMENT & PLAN NOTE
As per her sisters, patient has stents in place which have  "closed" as patient is noncompliant with her medications  Continue aspirin, statin, Eliquis

## 2019-09-29 NOTE — PLAN OF CARE
Problem: Potential for Falls  Goal: Patient will remain free of falls  Description  INTERVENTIONS:  - Assess patient frequently for physical needs  -  Identify cognitive and physical deficits and behaviors that affect risk of falls  -  [place] fall precautions as indicated by assessment   - Educate patient/family on patient safety including physical limitations  - Instruct patient to call for assistance with activity based on assessment  - Modify environment to reduce risk of injury  - Consider OT/PT consult to assist with strengthening/mobility  9/29/2019 0307 by Bunny Roy  Outcome: Progressing  9/29/2019 0306 by Ewing Dues Fahr  Outcome: Progressing     Problem: Nutrition/Hydration-ADULT  Goal: Nutrient/Hydration intake appropriate for improving, restoring or maintaining nutritional needs  Description  Monitor and assess patient's nutrition/hydration status for malnutrition  Collaborate with interdisciplinary team and initiate plan and interventions as ordered  Monitor patient's weight and dietary intake as ordered or per policy  Utilize nutrition screening tool and intervene as necessary  Determine patient's food preferences and provide high-protein, high-caloric foods as appropriate       INTERVENTIONS:  - Monitor oral intake, urinary output, labs, and treatment plans  - Assess nutrition and hydration status and recommend course of action  - Evaluate amount of meals eaten  - Assist patient with eating if necessary   - Allow adequate time for meals  - Recommend/ encourage appropriate diets, oral nutritional supplements, and vitamin/mineral supplements  - Order, calculate, and assess calorie counts as needed  - Recommend, monitor, and adjust tube feedings and TPN/PPN based on assessed needs  - Assess need for intravenous fluids  - Provide specific nutrition/hydration education as appropriate  - Include patient/family/caregiver in decisions related to nutrition  9/29/2019 0307 by Ewing Dues Fahr  Outcome: Progressing  9/29/2019 0306 by Soto Roblero  Outcome: Progressing     Problem: METABOLIC, FLUID AND ELECTROLYTES - ADULT  Goal: Electrolytes maintained within normal limits  Description  INTERVENTIONS:  - Monitor labs and assess patient for signs and symptoms of electrolyte imbalances  - Administer electrolyte replacement as ordered  - Monitor response to electrolyte replacements, including repeat lab results as appropriate  - Instruct patient on fluid and nutrition as appropriate  Outcome: Progressing  Goal: Fluid balance maintained  Description  INTERVENTIONS:  - Monitor labs   - Monitor I/O and WT  - Instruct patient on fluid and nutrition as appropriate  - Assess for signs & symptoms of volume excess or deficit  9/29/2019 0307 by Marvell Jewel Fahr  Outcome: Progressing  9/29/2019 0306 by Marvell Jewel Fahr  Outcome: Progressing  Goal: Glucose maintained within target range  Description  INTERVENTIONS:  - Monitor Blood Glucose as ordered  - Assess for signs and symptoms of hyperglycemia and hypoglycemia  - Administer ordered medications to maintain glucose within target range  - Assess nutritional intake and initiate nutrition service referral as needed  9/29/2019 0307 by Marvell Jewel Fahr  Outcome: Progressing  9/29/2019 0306 by Marvell Jewel Fahr  Outcome: Progressing

## 2019-09-29 NOTE — UTILIZATION REVIEW
Initial Clinical Review    Admission: Date/Time/Statement: Inpatient Admission Orders (From admission, onward)     Ordered        09/28/19 1403  Inpatient Admission  Once                   Orders Placed This Encounter   Procedures    Inpatient Admission     Standing Status:   Standing     Number of Occurrences:   1     Order Specific Question:   Admitting Physician     Answer:   Lawyer Duverney [[id number]]     Order Specific Question:   Level of Care     Answer:   Med Surg [[id number]]     Order Specific Question:   Estimated length of stay     Answer:   More than 2 Midnights     Order Specific Question:   Certification     Answer:   I certify that inpatient services are medically necessary for this patient for a duration of greater than two midnights  See H&P and MD Progress Notes for additional information about the patient's course of treatment  ED Arrival Information     Expected Arrival Acuity Means of Arrival Escorted By Service Admission Type    - 9/28/2019 10:55 Urgent Walk-In Family Member General Medicine Urgent    Arrival Complaint    High Blood Sugar        Chief Complaint   Patient presents with    Hyperglycemia - Symptomatic     pt presents to the ed from pcp for bs >800  pt states she feels generalized weakness      Assessment/Plan:   36-year-old female with past medical history of insulin-dependent diabetes, tobacco abuse, CAD, hyperlipidemia, hypertension, presents to the ER for evaluation of declining mental status, generalized weakness, and weight loss over the past few weeks  Patient lives with her brother however brother is not able to take care of her as he works a lot  Patient's sisters live nearby  Sisters are concerned that patient is not taking care of herself  Sisters are also concerned that patient is not taking her meds correctly  Patient noted to have 15 lb weight loss over the past few months  Sisters states that patient is not eating and cooking for herself    At this time sisters are looking for nursing home placement as they think that she is no longer able to take care herself and is no longer safe at home  Patient has no other children or living relatives  Sisters and brother make decision for patient  During interview patient is alert and oriented x3 however she appears to have generalized weakness  Patient was seen by her PCP yesterday and had stat labs ordered  Patient was also placed on Bactrim for right otitis media  Patient's outpatient BMP showed blood glucose level over 800  Patient was subsequently sent to the ED for further evaluation  Patient states that she has had congestion, cold symptoms, cough productive of white sputum over the past 1 week  Patient denies any difficulty breathing, fevers, chills at home  Type 2 diabetes mellitus with hyperglycemia, with long-term current use of insulin ([facility])  Assessment & Plan  No results found for: HGBA1C   Patient's blood sugar noted to be 728 in the ER  She received 10 units regular insulin  Uncontrolled blood sugars likely due to noncompliance  Admit patient for further management  Discontinue home medications  Patient started on insulin drip  Monitor blood sugars q 2 hours  Patient not in DKA  Check hemoglobin A1c level in a m  Hydration with IV fluids  Endocrinology consult  MATTI (acute kidney injury) [facility])  Assessment & Plan  Likely pre renal in nature  Hydration with IV fluids and get repeat lab work in a m  Patient is on Lasix p r n  At home which will be held  Hyponatremia  Assessment & Plan  Pseudohyponatremia from hyperglycemia  Corrected sodium 135 6  Repeat lab work in a m      ED Triage Vitals   Temperature Pulse Respirations Blood Pressure SpO2   09/28/19 1651 09/28/19 1104 09/28/19 1104 09/28/19 1104 09/28/19 1104   97 7 °F (36 5 °C) 79 20 110/56 95 %      Temp Source Heart Rate Source Patient Position - Orthostatic VS BP Location FiO2 (%)   09/28/19 1651 09/28/19 1104 09/28/19 1238 09/28/19 1238 --   Oral Monitor Sitting Right arm       Pain Score       09/28/19 1238       No Pain        Wt Readings from Last 1 Encounters:   09/28/19 61 2 kg (135 lb)     Additional Vital Signs:   09/29/19 0746    63               09/29/19 06:57:06  97 5 °F (36 4 °C)  55  18  121/54  76  96 %  None (Room air)  Lying   09/29/19 03:36:03  98 1 °F (36 7 °C)  54  Abnormal   18  137/54  82  96 %       09/28/19 23:45:39  97 8 °F (36 6 °C)  63  18  159/66  97  99 %         Pertinent Labs/Diagnostic Test Results:   Results from last 7 days   Lab Units 09/29/19  0451 09/28/19  1129   WBC Thousand/uL 7 94 6 35   HEMOGLOBIN g/dL 11 0* 12 5   HEMATOCRIT % 34 1* 38 3   PLATELETS Thousands/uL 226 234   NEUTROS ABS Thousands/µL  --  4 02     Results from last 7 days   Lab Units 09/29/19  0450 09/28/19  1129   SODIUM mmol/L 134* 123*   POTASSIUM mmol/L 3 6 4 2   CHLORIDE mmol/L 104 90*   CO2 mmol/L 20* 23   ANION GAP mmol/L 10 10   BUN mg/dL 26* 31*   CREATININE mg/dL 1 93* 2 42*   EGFR ml/min/1 73sq m 25 19   CALCIUM mg/dL 8 0* 8 2*   MAGNESIUM mg/dL  --  2 3   PHOSPHORUS mg/dL 3 9 4 6*     Results from last 7 days   Lab Units 09/28/19  1129   AST U/L 12   ALT U/L 26   ALK PHOS U/L 101   TOTAL PROTEIN g/dL 6 5   ALBUMIN g/dL 3 2*   TOTAL BILIRUBIN mg/dL 0 30     Results from last 7 days   Lab Units 09/29/19  1140 09/29/19  0753 09/29/19  0623 09/29/19  0406 09/29/19  0221 09/29/19  0022 09/28/19  2149 09/28/19  1957 09/28/19  1850 09/28/19  1817   POC GLUCOSE mg/dl 453* 273* 244* 237* 190* 212* 206* 171* 157* 66     Results from last 7 days   Lab Units 09/29/19  0450 09/28/19  1129   GLUCOSE RANDOM mg/dL 242* 728*     BETA-HYDROXYBUTYRATE   Date Value Ref Range Status   09/28/2019 0 4 <0 6 mmol/L Final      Results from last 7 days   Lab Units 09/28/19  1129   PH MAGDIEL  7 285*   PCO2 MAGDIEL mm Hg 44 0   PO2 MAGDIEL mm Hg 42 3   HCO3 MAGDIEL mmol/L 20 4*   BASE EXC MAGDIEL mmol/L -6 1   O2 CONTENT MAGDIEL ml/dL 14 6   O2 HGB, VENOUS % 76 9     Results from last 7 days   Lab Units 09/28/19  1129   TROPONIN I ng/mL <0 02     Results from last 7 days   Lab Units 09/28/19  1129   PROTIME seconds 10 7   INR  0 99   PTT seconds 29     Results from last 7 days   Lab Units 09/28/19  1129   TSH 3RD GENERATON uIU/mL 1 608     Results from last 7 days   Lab Units 09/28/19  1129   LACTIC ACID mmol/L 1 0     Results from last 7 days   Lab Units 09/28/19  1406   CLARITY UA  Clear   COLOR UA  Light Yellow   SPEC GRAV UA  <=1 005   PH UA  5 5   GLUCOSE UA mg/dl >=1000 (1%)*   KETONES UA mg/dl Negative   BLOOD UA  Negative   PROTEIN UA mg/dl Negative   NITRITE UA  Negative   BILIRUBIN UA  Negative   UROBILINOGEN UA E U /dl 0 2   LEUKOCYTES UA  Elevated glucose may cause decreased leukocyte values  See urine microscopic for [facility] result/*   WBC UA /hpf 20-30*   RBC UA /hpf None Seen   BACTERIA UA /hpf Occasional   EPITHELIAL CELLS WET PREP /hpf Occasional     Results from last 7 days   Lab Units 09/28/19  1407   AMPH/METH  Negative   BARBITURATE UR  Negative   BENZODIAZEPINE UR  Negative   COCAINE UR  Negative   METHADONE URINE  Negative   OPIATE UR  Negative   PCP UR  Negative   THC UR  Negative     CXR=Suspect decreased contractility  CT HEAD=No acute intracranial abnormality  Microangiopathic changes    EKG=Normal sinus rhythm  Anteroseptal infarct (cited on or before 29-JAN-2001)  T wave abnormality, consider inferolateral ischemia  ED Treatment:   Medication Administration from 09/28/2019 1055 to 09/28/2019 1607       Date/Time Order Dose Route     09/28/2019 1158 sodium chloride 0 9 % bolus 1,000 mL 1,000 mL Intravenous     09/28/2019 1201 cefTRIAXone (ROCEPHIN) IVPB (premix) 1,000 mg 1,000 mg Intravenous     09/28/2019 1336 sodium chloride 0 9 % bolus 1,000 mL 1,000 mL Intravenous     09/28/2019 1353 insulin regular (HumuLIN R,NovoLIN R) injection 10 Units 10 Units Intravenous     09/28/2019 1542 insulin regular (HumuLIN R,NovoLIN R) 1 Units/mL in sodium chloride 0 9 % 100 mL infusion 7 Units/hr Intravenous     09/28/2019 1434 insulin regular (HumuLIN R,NovoLIN R) 1 Units/mL in sodium chloride 0 9 % 100 mL infusion 10 Units/hr Intravenous        Past Medical History:   Diagnosis Date    CAD (coronary artery disease)     Diabetes mellitus ([facility] )     Hyperlipidemia     Hypertension     Skin cancer      Present on Admission:   MATTI (acute kidney injury) ([facility] )   Generalized weakness   Right otitis media   Essential hypertension   Hyponatremia   PAD (peripheral artery disease) ([facility])    Admitting Diagnosis: Hyperglycemia [R73 9]  Uncontrolled diabetes mellitus ([facility]) [E11 65]  Right otitis media [H66 91]  MATTI (acute kidney injury) ([facility] ) [N17 9]  Generalized weakness [R53 1]  Age/Sex: 68 y o  female  Admission Orders:  MED SURG  ACCUCHECKS WITH COVERAGE SCALE  PT/OT EVAL & TX  CONSULT ENDOCRINOLOGY  VENODYNES  Current Facility-Administered Medications:  apixaban 5 mg Oral BID   aspirin 81 mg Oral BID   atorvastatin 40 mg Oral HS   carvedilol 25 mg Oral BID With Meals   cefTRIAXone 1,000 mg Intravenous Q24H   cholecalciferol 1,000 Units Oral Daily   cyanocobalamin 1,000 mcg Oral Daily   doxazosin 4 mg Oral HS   gabapentin 300 mg Oral BID   ofloxacin 5 drop Right Ear BID   ondansetron 4 mg Intravenous Q6H PRN   PARoxetine 20 mg Oral Daily   sodium chloride 100 mL/hr Intravenous Continuous   insulin regular (HumuLIN R,NovoLIN R) 1 Units/mL in sodium chloride 0 9 % 100 mL infusion   Rate: 0 3-21 mL/hr Dose: 0 3-21 Units/hr  Freq: Titrated Route: IV    Network Utilization Review Department  Phone: [phone]; Fax [phone]  [email or web address]  ATTENTION: Please call with any questions or concerns to [phone] and carefully listen to the prompts so that you are directed to the right person  Send all requests for admission clinical reviews, approved or denied determinations and any other requests to fax [phone]   All voicemails are confidential

## 2019-09-30 LAB
ANION GAP SERPL CALCULATED.3IONS-SCNC: 10 MMOL/L (ref 4–13)
BUN SERPL-MCNC: 22 MG/DL (ref 5–25)
CALCIUM SERPL-MCNC: 8.2 MG/DL (ref 8.3–10.1)
CHLORIDE SERPL-SCNC: 107 MMOL/L (ref 100–108)
CO2 SERPL-SCNC: 20 MMOL/L (ref 21–32)
CREAT SERPL-MCNC: 1.52 MG/DL (ref 0.6–1.3)
GFR SERPL CREATININE-BSD FRML MDRD: 34 ML/MIN/1.73SQ M
GLUCOSE SERPL-MCNC: 211 MG/DL (ref 65–140)
GLUCOSE SERPL-MCNC: 245 MG/DL (ref 65–140)
GLUCOSE SERPL-MCNC: 255 MG/DL (ref 65–140)
GLUCOSE SERPL-MCNC: 375 MG/DL (ref 65–140)
GLUCOSE SERPL-MCNC: 422 MG/DL (ref 65–140)
HCT VFR BLD AUTO: 36 % (ref 34.8–46.1)
HGB BLD-MCNC: 11.5 G/DL (ref 11.5–15.4)
POTASSIUM SERPL-SCNC: 3.7 MMOL/L (ref 3.5–5.3)
SODIUM SERPL-SCNC: 137 MMOL/L (ref 136–145)

## 2019-09-30 PROCEDURE — G8978 MOBILITY CURRENT STATUS: HCPCS

## 2019-09-30 PROCEDURE — 82948 REAGENT STRIP/BLOOD GLUCOSE: CPT

## 2019-09-30 PROCEDURE — 80048 BASIC METABOLIC PNL TOTAL CA: CPT | Performed by: FAMILY MEDICINE

## 2019-09-30 PROCEDURE — 85014 HEMATOCRIT: CPT | Performed by: FAMILY MEDICINE

## 2019-09-30 PROCEDURE — 97163 PT EVAL HIGH COMPLEX 45 MIN: CPT

## 2019-09-30 PROCEDURE — 97116 GAIT TRAINING THERAPY: CPT

## 2019-09-30 PROCEDURE — 85018 HEMOGLOBIN: CPT | Performed by: FAMILY MEDICINE

## 2019-09-30 PROCEDURE — G8979 MOBILITY GOAL STATUS: HCPCS

## 2019-09-30 PROCEDURE — 99232 SBSQ HOSP IP/OBS MODERATE 35: CPT | Performed by: FAMILY MEDICINE

## 2019-09-30 PROCEDURE — 99223 1ST HOSP IP/OBS HIGH 75: CPT | Performed by: INTERNAL MEDICINE

## 2019-09-30 RX ORDER — INSULIN GLARGINE 100 [IU]/ML
20 INJECTION, SOLUTION SUBCUTANEOUS
Status: DISCONTINUED | OUTPATIENT
Start: 2019-09-30 | End: 2019-10-01

## 2019-09-30 RX ORDER — LORAZEPAM 0.5 MG/1
0.5 TABLET ORAL EVERY 8 HOURS PRN
Status: DISCONTINUED | OUTPATIENT
Start: 2019-09-30 | End: 2019-10-02 | Stop reason: HOSPADM

## 2019-09-30 RX ORDER — ACETAMINOPHEN 325 MG/1
650 TABLET ORAL EVERY 6 HOURS PRN
Status: DISCONTINUED | OUTPATIENT
Start: 2019-09-30 | End: 2019-10-02 | Stop reason: HOSPADM

## 2019-09-30 RX ADMIN — ASPIRIN 81 MG 81 MG: 81 TABLET ORAL at 08:02

## 2019-09-30 RX ADMIN — OFLOXACIN 5 DROP: 3 SOLUTION AURICULAR (OTIC) at 17:20

## 2019-09-30 RX ADMIN — INSULIN LISPRO 2 UNITS: 100 INJECTION, SOLUTION INTRAVENOUS; SUBCUTANEOUS at 22:22

## 2019-09-30 RX ADMIN — INSULIN LISPRO 5 UNITS: 100 INJECTION, SOLUTION INTRAVENOUS; SUBCUTANEOUS at 12:16

## 2019-09-30 RX ADMIN — INSULIN LISPRO 2 UNITS: 100 INJECTION, SOLUTION INTRAVENOUS; SUBCUTANEOUS at 08:02

## 2019-09-30 RX ADMIN — GABAPENTIN 300 MG: 300 CAPSULE ORAL at 08:02

## 2019-09-30 RX ADMIN — INSULIN GLARGINE 20 UNITS: 100 INJECTION, SOLUTION SUBCUTANEOUS at 22:21

## 2019-09-30 RX ADMIN — ASPIRIN 81 MG 81 MG: 81 TABLET ORAL at 17:21

## 2019-09-30 RX ADMIN — ACETAMINOPHEN 650 MG: 325 TABLET, FILM COATED ORAL at 00:26

## 2019-09-30 RX ADMIN — INSULIN LISPRO 5 UNITS: 100 INJECTION, SOLUTION INTRAVENOUS; SUBCUTANEOUS at 08:06

## 2019-09-30 RX ADMIN — CARVEDILOL 25 MG: 25 TABLET, FILM COATED ORAL at 08:02

## 2019-09-30 RX ADMIN — CEFTRIAXONE 1000 MG: 1 INJECTION, SOLUTION INTRAVENOUS at 10:56

## 2019-09-30 RX ADMIN — SODIUM CHLORIDE 75 ML/HR: 0.9 INJECTION, SOLUTION INTRAVENOUS at 00:23

## 2019-09-30 RX ADMIN — APIXABAN 5 MG: 5 TABLET, FILM COATED ORAL at 08:02

## 2019-09-30 RX ADMIN — INSULIN LISPRO 6 UNITS: 100 INJECTION, SOLUTION INTRAVENOUS; SUBCUTANEOUS at 17:14

## 2019-09-30 RX ADMIN — ATORVASTATIN CALCIUM 40 MG: 40 TABLET, FILM COATED ORAL at 22:21

## 2019-09-30 RX ADMIN — PAROXETINE 20 MG: 20 TABLET, FILM COATED ORAL at 08:02

## 2019-09-30 RX ADMIN — OFLOXACIN 5 DROP: 3 SOLUTION AURICULAR (OTIC) at 08:02

## 2019-09-30 RX ADMIN — CARVEDILOL 25 MG: 25 TABLET, FILM COATED ORAL at 15:39

## 2019-09-30 RX ADMIN — INSULIN LISPRO 4 UNITS: 100 INJECTION, SOLUTION INTRAVENOUS; SUBCUTANEOUS at 11:01

## 2019-09-30 RX ADMIN — APIXABAN 5 MG: 5 TABLET, FILM COATED ORAL at 17:20

## 2019-09-30 RX ADMIN — VITAMIN D, TAB 1000IU (100/BT) 1000 UNITS: 25 TAB at 08:02

## 2019-09-30 RX ADMIN — CYANOCOBALAMIN TAB 500 MCG 1000 MCG: 500 TAB at 08:02

## 2019-09-30 RX ADMIN — DOXAZOSIN 4 MG: 2 TABLET ORAL at 22:21

## 2019-09-30 RX ADMIN — GABAPENTIN 300 MG: 300 CAPSULE ORAL at 17:21

## 2019-09-30 NOTE — PROGRESS NOTES
[facility] Internal Medicine Progress Note  Patient: Obi Perdomo 68 y o  female   MRN: [id number]  PCP: Keith Patel MD  Unit/Bed#: [address] [place] [address] Encounter: [id number]  Date Of Visit: 09/30/19    Problem List:    Principal Problem:    Type 2 diabetes mellitus with hyperglycemia, with long-term current use of insulin ([place] [address] )  Active Problems:    MATTI (acute kidney injury) ([place] [address] )    Right otitis media    Hyponatremia    Generalized weakness    Essential hypertension    PAD (peripheral artery disease) ([place] [address] )      Assessment & Plan:    * Type 2 diabetes mellitus with hyperglycemia, with long-term current use of insulin [facility])  Assessment & Plan  Lab Results   Component Value Date    HGBA1C 12 3 (H) 09/29/2019       Recent Labs     09/29/19  1634 09/29/19  2102 09/30/19  0718 09/30/19  1059   POCGLU 488* 262* 245* 375*     Patient's blood sugar noted to be 728 in the ER  She received 10 units regular insulin  Uncontrolled diabetes likely due to noncompliance  Discontinued home medications  Patient was started on insulin drip which was later discontinued as blood sugar went down to 66  Patient not in DKA  Discontinue IV Hydration   Sugars are not well controlled  Increased to 8 units scheduled Humalog  Continue 15 units of Lantus q h s  Along with sliding scale insulin with meals  Further management as per Endocrinology    MATTI (acute kidney injury) [facility])  Assessment & Plan  Likely pre renal in nature  Creatinine has improved  Discontinued IV Hydration  get repeat lab work in a m  Patient is on Lasix p r n  At home which is being held    Hyponatremia  Assessment & Plan  Pseudohyponatremia from hyperglycemia  Sodium level improved  Repeat lab work in a m  Right otitis media  Assessment & Plan  Patient received a dose of IV Rocephin in the ER which will be continued    Continue otic drops  Discontinued Bactrim    PAD (peripheral artery disease) ([facility] )  Assessment & Plan  As per her sisters, patient has stents in place which have  "closed" as patient is noncompliant with her medications  Continue aspirin, statin, Eliquis    Essential hypertension  Assessment & Plan  Continue Coreg, Cardura  Blood pressures have been labile  monitor blood pressures  Generalized weakness  Assessment & Plan  Likely due to deconditioning, weight loss, hyperglycemia  PT/OT while here  VTE Pharmacologic Prophylaxis:   Pharmacologic: Apixaban (Eliquis)  Mechanical VTE Prophylaxis in Place: Yes    Patient Centered Rounds: I have performed bedside rounds with nursing staff today  Discussions with Specialists or Other Care Team Provider: yes    Education and Discussions with Family / Patient: yes    Time Spent for Care: 35 min  More than 50% of total time spent on counseling and coordination of care as described above  Current Length of Stay: 2 day(s)    Current Patient Status: Inpatient   Certification Statement: The patient will continue to require additional inpatient hospital stay due to Type 2 diabetes with persistent hyperglycemia, acute kidney injury    Discharge Plan: Pending    Code Status: Level 1 - Full Code      Subjective:     Patient reports being tired  denies any shortness of breath, pain    Objective:     Vitals:   Temp (24hrs), Av 7 °F (36 5 °C), Min:97 3 °F (36 3 °C), Max:98 2 °F (36 8 °C)    Temp:  [97 3 °F (36 3 °C)-98 2 °F (36 8 °C)] 97 4 °F (36 3 °C)  HR:  [53-66] 57  Resp:  [18-19] 18  BP: (142-188)/(54-75) 142/54  SpO2:  [96 %-98 %] 97 %  Body mass index is 26 37 kg/m²  Input and Output Summary (last 24 hours): Intake/Output Summary (Last 24 hours) at 2019 1257  Last data filed at 2019 0601  Gross per 24 hour   Intake 900 ml   Output 700 ml   Net 200 ml       Physical Exam:     Physical Exam   Constitutional: She is oriented to person, place, and time  She appears well-developed and well-nourished  No distress  HENT:   Head: Normocephalic and atraumatic     Eyes: EOM are normal  Right eye exhibits no discharge  Left eye exhibits no discharge  No scleral icterus  Neck: Neck supple  Cardiovascular: Normal rate and regular rhythm  Pulmonary/Chest: Effort normal and breath sounds normal  No respiratory distress  She has no wheezes  She has no rales  Abdominal: Soft  Bowel sounds are normal  She exhibits no distension  There is no tenderness  Musculoskeletal: She exhibits no edema  Neurological: She is alert and oriented to person, place, and time  No cranial nerve deficit  Skin: She is not diaphoretic  Additional Data:     Labs:    Results from last 7 days   Lab Units 09/30/19  0524 09/29/19  0451 09/28/19  1129   WBC Thousand/uL  --  7 94 6 35   HEMOGLOBIN g/dL 11 5 11 0* 12 5   HEMATOCRIT % 36 0 34 1* 38 3   PLATELETS Thousands/uL  --  226 234   NEUTROS PCT %  --   --  64   LYMPHS PCT %  --   --  25   MONOS PCT %  --   --  6   EOS PCT %  --   --  4     Results from last 7 days   Lab Units 09/30/19  0524  09/28/19  1129   POTASSIUM mmol/L 3 7   < > 4 2   CHLORIDE mmol/L 107   < > 90*   CO2 mmol/L 20*   < > 23   BUN mg/dL 22   < > 31*   CREATININE mg/dL 1 52*   < > 2 42*   CALCIUM mg/dL 8 2*   < > 8 2*   ALK PHOS U/L  --   --  101   ALT U/L  --   --  26   AST U/L  --   --  12    < > = values in this interval not displayed  Results from last 7 days   Lab Units 09/28/19  1129   INR  0 99       * I Have Reviewed All Lab Data Listed Above  * Additional Pertinent Lab Tests Reviewed:  [facility] Admission Reviewed      Imaging:  Imaging Reports Reviewed Today Include: CT head, CXR  Imaging Personally Reviewed by Myself Includes:  N/A    Recent Cultures (last 7 days):     Results from last 7 days   Lab Units 09/28/19  1406   URINE CULTURE  No Growth <1000 cfu/mL       Last 24 Hours Medication List:     Current Facility-Administered Medications:  acetaminophen 650 mg Oral Q6H PRN Alexandra Lucia MD    apixaban 5 mg Oral BID Lenore Xiao DO    aspirin 81 mg Oral BID Lenore Revankar, DO    atorvastatin 40 mg Oral HS Lenore Revankar, DO    carvedilol 25 mg Oral BID With Meals Lenore Revankar, DO    cefTRIAXone 1,000 mg Intravenous Q24H Lenore Revankar, DO Last Rate: 1,000 mg (09/30/19 1056)   cholecalciferol 1,000 Units Oral Daily Lenore Revankar, DO    cyanocobalamin 1,000 mcg Oral Daily Lenore Revankar, DO    doxazosin 4 mg Oral HS Lenore Revankar, DO    gabapentin 300 mg Oral BID Lenore Revankar, DO    insulin glargine 15 Units Subcutaneous HS Lenore Revankar, DO    insulin lispro 1-5 Units Subcutaneous HS Corina Olguin MD    insulin lispro 1-5 Units Subcutaneous TID AC Lenore Revankar, DO    insulin lispro 8 Units Subcutaneous TID With Meals Lenore Revankar, DO    ofloxacin 5 drop Right Ear BID Lenore Revankar, DO    ondansetron 4 mg Intravenous Q6H PRN Lenore Revankar, DO    PARoxetine 20 mg Oral Daily Lenore Xiao, DO           Today, Patient Was Seen By: Yann Chiu DO    ** Please Note: "This note has been constructed using a voice recognition system  Therefore there may be syntax, spelling, and/or grammatical errors   Please call if you have any questions  "**

## 2019-09-30 NOTE — CONSULTS
Consultation - Mariusz Lars 68 y o  female MRN: [id number]    Unit/Bed#: 2 [place] [address] Encounter: [id number]      Identifying Data:  68years old white female is admitted at [facility] on 2019 with hyperglycemia psychiatric consultation is asked for the depression patient examined spoke with the nurse history physical medications labs reviewed and noted  Patient is very hard of hearing and poor historian she is minimizing her problems she was able to tell me that she is 68years old and she is at the hospital she came here for''my sugar''she also reports that she is taking Paxil since her   8 years ago from the family physician and it is helping her  Nurse described her being very anxious at times  Patient is not able to give out any new stress other than ongoing her health issues  She reports that her   8 years ago and she has no children she reports that she lives alone but in the record it is reported that brother lives with her who works a lot  Patient still drives a car  Patient smokes once in a while she denies abusing alcohol or drugs she has no history of drug and alcohol abuse she is suffering from depression anxiety coronary artery disease diabetes high cholesterol hypertension skin cancer history of cholecystectomy CABG spinal surgery vascular surgery stent in legs she is allergic to penicillin    Chief Complaints:  Depression    Family History:  Patient denies  Family History   Problem Relation Age of Onset    Arthritis Mother     Cancer Mother     Diabetes Father     Stroke Father     Heart disease Father        Legal History:  Patient denies    Mental Status Exam:  68years old white female is alert awake oriented to place and person very hard of hearing and poor historian confabulates with many questions patient is depressed but she is in denial she was not fully aware that she is taking Paxil since her   8 years ago    Poor sleep appetite okay  Patient denies auditory or visual hallucinations  Patient denies suicidal or homicidal ideations  Chronic insomnia  No paranoia no delusion elucidated  Poor concentration  Insight and judgments are adequate  History of Present Illness     HPI: Valeria Swan is a 68y o  year old female who presents with hyperglycemia    Consults      Historical Information   Past Psychiatric History:  Patient has been depressed at least since 10 years her   and she has been taking Paxil since then she is a poor historian she denies seen a psychiatrist she denies taking any other medications  She never seen a psychotherapist   Patient denies history of drug and alcohol abuse  Patient denies legal problems in the past   Patient denies psychiatric admissions in the past   Patient denies suicide attempts in the past   Currently patient is taking Paxil 20 mg daily from the family physician    Past Medical History:   Diagnosis Date    CAD (coronary artery disease)     Diabetes mellitus ([facility] [address] )     Hyperlipidemia     Hypertension     Skin cancer      Past Surgical History:   Procedure Laterality Date    CHOLECYSTECTOMY      CORONARY ARTERY BYPASS GRAFT      SPINAL FUSION      VASCULAR SURGERY Bilateral     Stent in legs     Social History   Social History     Substance and Sexual Activity   Alcohol Use Never    Frequency: Never     Social History     Substance and Sexual Activity   Drug Use Never     Social History     Tobacco Use   Smoking Status Former Smoker    Types: Cigarettes   Smokeless Tobacco Never Used       Meds/Allergies   current meds:   Current Facility-Administered Medications   Medication Dose Route Frequency    acetaminophen (TYLENOL) tablet 650 mg  650 mg Oral Q6H PRN    apixaban (ELIQUIS) tablet 5 mg  5 mg Oral BID    aspirin chewable tablet 81 mg  81 mg Oral BID    atorvastatin (LIPITOR) tablet 40 mg  40 mg Oral HS    carvedilol (COREG) tablet 25 mg  25 mg Oral BID With Meals    cefTRIAXone (ROCEPHIN) IVPB (premix) 1,000 mg  1,000 mg Intravenous Q24H    cholecalciferol (VITAMIN D3) tablet 1,000 Units  1,000 Units Oral Daily    cyanocobalamin (VITAMIN B-12) tablet 1,000 mcg  1,000 mcg Oral Daily    doxazosin (CARDURA) tablet 4 mg  4 mg Oral HS    gabapentin (NEURONTIN) capsule 300 mg  300 mg Oral BID    insulin glargine (LANTUS) subcutaneous injection 20 Units 0 2 mL  20 Units Subcutaneous HS    insulin lispro (HumaLOG) 100 units/mL subcutaneous injection 1-5 Units  1-5 Units Subcutaneous HS    insulin lispro (HumaLOG) 100 units/mL subcutaneous injection 1-6 Units  1-6 Units Subcutaneous TID AC    insulin lispro (HumaLOG) 100 units/mL subcutaneous injection 8 Units  8 Units Subcutaneous TID With Meals    ofloxacin (FLOXIN) 0 3 % otic solution 5 drop  5 drop Right Ear BID    ondansetron (ZOFRAN) injection 4 mg  4 mg Intravenous Q6H PRN    PARoxetine (PAXIL) tablet 20 mg  20 mg Oral Daily    and PTA meds:    Medications Prior to Admission   Medication    ofloxacin (FLOXIN) 0 3 % otic solution    sulfamethoxazole-trimethoprim (BACTRIM DS) 800-160 mg per tablet    Ascorbic Acid (VITAMIN C) 100 MG tablet    aspirin 81 mg chewable tablet    atorvastatin (LIPITOR) 40 mg tablet    carvedilol (COREG) 25 mg tablet    cholecalciferol (VITAMIN D3) 1,000 units tablet    cyanocobalamin (VITAMIN B-12) 100 mcg tablet    doxazosin (CARDURA) 4 mg tablet    furosemide (LASIX) 40 mg tablet    gabapentin (NEURONTIN) 100 mg capsule    insulin aspart (NOVOLOG FLEXPEN) 100 Units/mL injection pen    PARoxetine (PAXIL) 10 mg tablet    ranitidine (ZANTAC) 150 mg tablet    Vitamin E 400 units TABS     Allergies   Allergen Reactions    Penicillins Hives       Objective   Vitals: Blood pressure 146/62, pulse 60, temperature 97 5 °F (36 4 °C), temperature source Oral, resp  rate 18, height 5' (1 524 m), weight 61 2 kg (135 lb), SpO2 99 %        Routine Lab Results:   Admission on 09/28/2019   Component Date Value Ref Range Status    POC Glucose 09/28/2019 >500* 65 - 140 mg/dl Final    CRITICAL VALUE NOTED    WBC 09/28/2019 6 35  4 31 - 10 16 Thousand/uL Final    RBC 09/28/2019 4 28  3 81 - 5 12 Million/uL Final    Hemoglobin 09/28/2019 12 5  11 5 - 15 4 g/dL Final    Hematocrit 09/28/2019 38 3  34 8 - 46 1 % Final    MCV 09/28/2019 90  82 - 98 fL Final    MCH 09/28/2019 29 2  26 8 - 34 3 pg Final    MCHC 09/28/2019 32 6  31 4 - 37 4 g/dL Final    RDW 09/28/2019 12 7  11 6 - 15 1 % Final    MPV 09/28/2019 10 7  8 9 - 12 7 fL Final    Platelets 97/34/8284 234  149 - 390 Thousands/uL Final    nRBC 09/28/2019 0  /100 WBCs Final    Neutrophils Relative 09/28/2019 64  43 - 75 % Final    Immat GRANS % 09/28/2019 0  0 - 2 % Final    Lymphocytes Relative 09/28/2019 25  14 - 44 % Final    Monocytes Relative 09/28/2019 6  4 - 12 % Final    Eosinophils Relative 09/28/2019 4  0 - 6 % Final    Basophils Relative 09/28/2019 1  0 - 1 % Final    Neutrophils Absolute 09/28/2019 4 02  1 85 - 7 62 Thousands/µL Final    Immature Grans Absolute 09/28/2019 0 02  0 00 - 0 20 Thousand/uL Final    Lymphocytes Absolute 09/28/2019 1 61  0 60 - 4 47 Thousands/µL Final    Monocytes Absolute 09/28/2019 0 36  0 17 - 1 22 Thousand/µL Final    Eosinophils Absolute 09/28/2019 0 28  0 00 - 0 61 Thousand/µL Final    Basophils Absolute 09/28/2019 0 06  0 00 - 0 10 Thousands/µL Final    Protime 09/28/2019 10 7  9 8 - 12 0 seconds Final    INR 09/28/2019 0 99  0 91 - 1 09 Final    PTT 09/28/2019 29  23 - 37 seconds Final    Therapeutic Heparin Range =  60-90 seconds    Magnesium 09/28/2019 2 3  1 6 - 2 6 mg/dL Final    Phosphorus 09/28/2019 4 6* 2 3 - 4 1 mg/dL Final    TSH 3RD GENERATON 09/28/2019 1 608  0 358 - 3 740 uIU/mL Final      The recommended reference ranges for TSH during pregnancy are as follows:   First trimester 0 1 to 2 5 uIU/mL   Second trimester  0 2 to 3 0 uIU/mL   Third trimester 0 3 to 3 0 uIU/m    Note: Normal ranges may not apply to patients who are transgender, non-binary, or whose legal sex, sex at birth, and gender identity differ  Using supplements with high doses of biotin 20 to more than 300 times greater than the adequate daily intake for adults of 30 mcg/day as established by the [place] [employer], can cause falsely depress results   LACTIC ACID 09/28/2019 1 0  0 5 - 2 0 mmol/L Final    Color, UA 09/28/2019 Light Yellow   Final    Clarity, UA 09/28/2019 Clear   Final    Specific Gravity, UA 09/28/2019 <=1 005  1 000 - 1 030 Final    pH, UA 09/28/2019 5 5  5 0, 5 5, 6 0, 6 5, 7 0, 7 5, 8 0, 8 5, 9 0 Final    Leukocytes, UA 09/28/2019 Elevated glucose may cause decreased leukocyte values   See urine microscopic for [facility] result/* Negative Final    Nitrite, UA 09/28/2019 Negative  Negative Final    Protein, UA 09/28/2019 Negative  Negative mg/dl Final    Glucose, UA 09/28/2019 >=1000 (1%)* Negative mg/dl Final    Ketones, UA 09/28/2019 Negative  Negative mg/dl Final    Urobilinogen, UA 09/28/2019 0 2  0 2, 1 0 E U /dl E U /dl Final    Bilirubin, UA 09/28/2019 Negative  Negative Final    Blood, UA 09/28/2019 Negative  Negative Final    Amph/Meth UR 09/28/2019 Negative  Negative Final    Barbiturate Ur 09/28/2019 Negative  Negative Final    Benzodiazepine Urine 09/28/2019 Negative  Negative Final    Cocaine Urine 09/28/2019 Negative  Negative Final    Methadone Urine 09/28/2019 Negative  Negative Final    Opiate Urine 09/28/2019 Negative  Negative Final    PCP Ur 09/28/2019 Negative  Negative Final    THC Urine 09/28/2019 Negative  Negative Final    Sodium 09/28/2019 123* 136 - 145 mmol/L Final    Potassium 09/28/2019 4 2  3 5 - 5 3 mmol/L Final    Chloride 09/28/2019 90* 100 - 108 mmol/L Final    CO2 09/28/2019 23  21 - 32 mmol/L Final    ANION GAP 09/28/2019 10  4 - 13 mmol/L Final    BUN 09/28/2019 31* 5 - 25 mg/dL Final    Creatinine 09/28/2019 2 42* 0 60 - 1 30 mg/dL Final    Standardized to IDMS reference method    Glucose 09/28/2019 728* 65 - 140 mg/dL Final      If the patient is fasting, the ADA then defines impaired fasting glucose as > 100 mg/dL and diabetes as > or equal to 123 mg/dL  Specimen collection should occur prior to Sulfasalazine administration due to the potential for falsely depressed results  Specimen collection should occur prior to Sulfapyridine administration due to the potential for falsely elevated results   Calcium 09/28/2019 8 2* 8 3 - 10 1 mg/dL Final    AST 09/28/2019 12  5 - 45 U/L Final      Specimen collection should occur prior to Sulfasalazine administration due to the potential for falsely depressed results   ALT 09/28/2019 26  12 - 78 U/L Final      Specimen collection should occur prior to Sulfasalazine administration due to the potential for falsely depressed results       Alkaline Phosphatase 09/28/2019 101  46 - 116 U/L Final    Total Protein 09/28/2019 6 5  6 4 - 8 2 g/dL Final    Albumin 09/28/2019 3 2* 3 5 - 5 0 g/dL Final    Total Bilirubin 09/28/2019 0 30  0 20 - 1 00 mg/dL Final    eGFR 09/28/2019 19  ml/min/1 73sq m Final    BETA-HYDROXYBUTYRATE 09/28/2019 0 4  <0 6 mmol/L Final    pH, [facility] 09/28/2019 7 285* 7 300 - 7 400 Final    pCO2, [facility] 09/28/2019 44 0  42 0 - 50 0 mm Hg Final    pO2, [facility] 09/28/2019 42 3  35 0 - 45 0 mm Hg Final    HCO3, [facility] 09/28/2019 20 4* 24 - 30 mmol/L Final    Base Excess, [place] 09/28/2019 -6 1  mmol/L Final    O2 Content, [place] 09/28/2019 14 6  ml/dL Final    O2 HGB, VENOUS 09/28/2019 76 9  60 0 - 80 0 % Final    Troponin I 09/28/2019 <0 02  <=0 04 ng/mL Final    Autovalidation override  [employer] Chemistry analyzer 99% cutoff is > 0 04 ng/mL in network labs     o cTnI 99% cutoff is useful only when applied to patients in the clinical setting of myocardial ischemia   o cTnI 99% cutoff should be interpreted in the context of clinical history, ECG findings and possibly cardiac imaging to establish correct diagnosis  o cTnI 99% cutoff may be suggestive but clearly not indicative of a coronary event without the clinical setting of myocardial ischemia        POC Glucose 09/28/2019 >500* 65 - 140 mg/dl Final    CRITICAL VALUE NOTED REPEAT    Ventricular Rate 09/28/2019 74  BPM Final    Atrial Rate 09/28/2019 74  BPM Final    RI Interval 09/28/2019 188  ms Final    QRSD Interval 09/28/2019 86  ms Final    QT Interval 09/28/2019 408  ms Final    QTC Interval 09/28/2019 452  ms Final    P Axis 09/28/2019 90  degrees Final    QRS Axis 09/28/2019 31  degrees Final    T [place] 09/28/2019 178  degrees Final    RBC, UA 09/28/2019 None Seen  None Seen, 0-5 /hpf Final    WBC, UA 09/28/2019 20-30* None Seen, 0-5, 5-55, 5-65 /hpf Final    Epithelial Cells 09/28/2019 Occasional  None Seen, Occasional /hpf Final    Bacteria, UA 09/28/2019 Occasional  None Seen, Occasional /hpf Final    WBC Clumps 09/28/2019 present   Final    OTHER OBSERVATIONS 09/28/2019 Yeast Cells Present   Final    Urine Culture 09/28/2019 No Growth <1000 cfu/mL   Final    POC Glucose 09/28/2019 >500* 65 - 140 mg/dl Final    CRITICAL VALUE NOTED    POC Glucose 09/28/2019 339* 65 - 140 mg/dl Final    POC Glucose 09/28/2019 66  65 - 140 mg/dl Final    POC Glucose 09/28/2019 157* 65 - 140 mg/dl Final    POC Glucose 09/28/2019 171* 65 - 140 mg/dl Final    POC Glucose 09/28/2019 206* 65 - 140 mg/dl Final    POC Glucose 09/29/2019 212* 65 - 140 mg/dl Final    POC Glucose 09/29/2019 190* 65 - 140 mg/dl Final    Sodium 09/29/2019 134* 136 - 145 mmol/L Final    Potassium 09/29/2019 3 6  3 5 - 5 3 mmol/L Final    Chloride 09/29/2019 104  100 - 108 mmol/L Final    CO2 09/29/2019 20* 21 - 32 mmol/L Final    ANION GAP 09/29/2019 10  4 - 13 mmol/L Final    BUN 09/29/2019 26* 5 - 25 mg/dL Final    Creatinine 09/29/2019 1 93* 0 60 - 1 30 mg/dL Final    Standardized to IDMS reference method    Glucose 09/29/2019 242* 65 - 140 mg/dL Final      If the patient is fasting, the ADA then defines impaired fasting glucose as > 100 mg/dL and diabetes as > or equal to 123 mg/dL  Specimen collection should occur prior to Sulfasalazine administration due to the potential for falsely depressed results  Specimen collection should occur prior to Sulfapyridine administration due to the potential for falsely elevated results      Calcium 09/29/2019 8 0* 8 3 - 10 1 mg/dL Final    eGFR 09/29/2019 25  ml/min/1 73sq m Final    Phosphorus 09/29/2019 3 9  2 3 - 4 1 mg/dL Final    WBC 09/29/2019 7 94  4 31 - 10 16 Thousand/uL Final    RBC 09/29/2019 3 79* 3 81 - 5 12 Million/uL Final    Hemoglobin 09/29/2019 11 0* 11 5 - 15 4 g/dL Final    Hematocrit 09/29/2019 34 1* 34 8 - 46 1 % Final    MCV 09/29/2019 90  82 - 98 fL Final    MCH 09/29/2019 29 0  26 8 - 34 3 pg Final    MCHC 09/29/2019 32 3  31 4 - 37 4 g/dL Final    RDW 09/29/2019 12 7  11 6 - 15 1 % Final    Platelets 02/55/3225 226  149 - 390 Thousands/uL Final    MPV 09/29/2019 10 9  8 9 - 12 7 fL Final    Hemoglobin A1C 09/29/2019 12 3* 4 2 - 6 3 % Final    EAG 09/29/2019 306  mg/dl Final    POC Glucose 09/29/2019 237* 65 - 140 mg/dl Final    POC Glucose 09/29/2019 244* 65 - 140 mg/dl Final    POC Glucose 09/29/2019 273* 65 - 140 mg/dl Final    POC Glucose 09/29/2019 453* 65 - 140 mg/dl Final    POC Glucose 09/29/2019 488* 65 - 140 mg/dl Final    POC Glucose 09/29/2019 262* 65 - 140 mg/dl Final    Sodium 09/30/2019 137  136 - 145 mmol/L Final    Potassium 09/30/2019 3 7  3 5 - 5 3 mmol/L Final    Chloride 09/30/2019 107  100 - 108 mmol/L Final    CO2 09/30/2019 20* 21 - 32 mmol/L Final    ANION GAP 09/30/2019 10  4 - 13 mmol/L Final    BUN 09/30/2019 22  5 - 25 mg/dL Final    Creatinine 09/30/2019 1 52* 0 60 - 1 30 mg/dL Final    Standardized to IDMS reference method    Glucose 09/30/2019 211* 65 - 140 mg/dL Final      If the patient is fasting, the ADA then defines impaired fasting glucose as > 100 mg/dL and diabetes as > or equal to 123 mg/dL  Specimen collection should occur prior to Sulfasalazine administration due to the potential for falsely depressed results  Specimen collection should occur prior to Sulfapyridine administration due to the potential for falsely elevated results   Calcium 09/30/2019 8 2* 8 3 - 10 1 mg/dL Final    eGFR 09/30/2019 34  ml/min/1 73sq m Final    Hemoglobin 09/30/2019 11 5  11 5 - 15 4 g/dL Final    Hematocrit 09/30/2019 36 0  34 8 - 46 1 % Final    POC Glucose 09/30/2019 245* 65 - 140 mg/dl Final    POC Glucose 09/30/2019 375* 65 - 140 mg/dl Final         Diagnosis:  Major depression recurrent  Anxiety  Insomnia    Plan:  Continue Paxil 20 mg daily  Ativan 0 5 mg p o  Q 8 hours p r n  For anxiety  Psychotherapy  Psychiatric follow-up recommended after the discharge  Discussed with the nurse  I will follow up during the hospital stay      Irma Guzman MD

## 2019-09-30 NOTE — PHYSICAL THERAPY NOTE
PT EVALUATION       09/30/19 0905   Note Type   Note type Eval/Treat   Pain Assessment   Pain Assessment No/denies pain   Home Living   Type of Home House  (1 MIKEY in back)   Home Layout Two level;Bed/bath upstairs   Bathroom Shower/Tub Walk-in shower   Bathroom Equipment Built-in shower seat;Grab bars in [address]  (RW)   Prior Function   Level of [place] Independent with ADLs and functional mobility  (amb w/out AD PTA)   Lives With Family  (pt's brother, but per pt brother not there much)   Receives Help From Family   ADL Assistance Independent   Comments Pt has a cleaning person "whenever I ask her to come" and pt's brother or sisters usually do the grocery shopping, but pt can do it if she has to  Restrictions/Precautions   Other Precautions Fall Risk;Bed Alarm; Chair Alarm; Impulsive   General   Additional Pertinent History Pt adm with hyperglycemia  Pt also with weight loss over past few months  Family/Caregiver Present No   Cognition   Overall Cognitive Status WFL   Arousal/Participation Cooperative   Orientation Level Oriented X4   Following Commands Follows all commands and directions without difficulty   RLE Assessment   RLE Assessment WFL  (3 to 3+/5)   LLE Assessment   LLE Assessment WFL  (3 to 3+/5)   Bed Mobility   Supine to Sit 7  Independent   Transfers   Sit to Stand 4  Minimal assistance   Additional items Assist x 1;Verbal cues   Stand to Sit 4  Minimal assistance   Additional items Assist x 1;Verbal cues   Ambulation/Elevation   Gait pattern   (unsteady)   Gait Assistance 4  Minimal assist   Additional items Assist x 1;Verbal cues; Tactile cues   Assistive Device None   Distance 60 feet with change in direction  Pt sat OOB in chair with all needs in reach, (+) chair alarm     Balance   Static Sitting Good   Static Standing Fair -   Dynamic Standing Poor +   Ambulatory Poor +   Activity Tolerance   Activity Tolerance Patient limited by fatigue   Assessment   Prognosis Good   Problem List Decreased strength;Decreased range of motion;Decreased endurance; Impaired balance;Decreased mobility; Decreased coordination;Decreased safety awareness   Assessment Patient seen for Physical Therapy evaluation  Patient admitted with Type 2 diabetes mellitus with hyperglycemia, with long-term current use of insulin ([facility] [address] )  Comorbidities affecting patient's physical performance include: MATTI, generalized weakness, HTN, PAD, hyponatremia, DM 2, diabetic polyneuropathy, radiculopathy of lumbar region  Personal factors affecting patient at time of initial evaluation include: lives in two story house, stairs to enter home, inability to navigate community distances, inability to navigate level surfaces without external assistance, inability to perform dynamic tasks in community and limited home support  Prior to admission, patient was independent with functional mobility without assistive device, independent with ADLS, living with brother in a two level home with 1 steps to enter and ambulating household distance  Please find objective findings from Physical Therapy assessment regarding body systems outlined above with impairments and limitations including weakness, decreased ROM, impaired balance, decreased endurance, impaired coordination, gait deviations, decreased activity tolerance, decreased functional mobility tolerance, decreased safety awareness and fall risk  The Barthel Index was used as a functional outcome tool presenting with a score of 50 today indicating marked limitations of functional mobility and ADLS  Patient's clinical presentation is currently unstable/unpredictable as seen in patient's presentation of vital sign response, increased fall risk, new onset of impairment of functional mobility, decreased endurance and new onset of weakness  Pt would benefit from continued Physical Therapy treatment to address deficits as defined above and maximize level of functional mobility   As demonstrated by objective findings, the assigned level of complexity for this evaluation is high  Goals   Patient Goals go home   STG Expiration Date 10/07/19   Short Term Goal #1 trans - S; pt will amb with RW functional household distances - S   Short Term Goal #2 up/down one step - S so pt can enter/exit her home; balance with RW - F/F+ for safe mobility and to decrease fall risk   LTG Expiration Date 10/14/19   Long Term Goal #1 trans - I; pt will amb with RW functional household/community distances - I; up/down full flight of steps - I so pt can enter/exit her home and access all areas of her home   Long Term Goal #2 balance with RW - F+/G for safe mobility and to decrease fall risk; strength LEs - 3+ to 4-/5   Plan   Treatment/Interventions ADL retraining;Functional transfer training;LE strengthening/ROM; Elevations; Therapeutic exercise; Endurance training;Patient/family training;Equipment eval/education; Bed mobility;Gait training; Compensatory technique education   PT Frequency 5x/wk   Recommendation   Recommendation Short-term skilled PT  (vs home PT pending progress)   Equipment Recommended   (pt has a RW and cane)   Barthel Index   Feeding 10   Bathing 0   Grooming Score 0   Dressing Score 5   Bladder Score 10   Bowels Score 10   Toilet Use Score 5   Transfers (Bed/Chair) Score 10   Mobility (Level Surface) Score 0   Stairs Score 0   Barthel Index Score 48   Licensure   [place] License Number  Gumaro Blakely [place], [place] [id number]     Time XC:3616  Time JL  Total Time: 10 mins      S:  "I'm just weak because I haven't walked in a few days"  O:  Pt trans sit to stand with min A/S  Pt amb with RW x 60 feet with change in direction with min A/S  Pt trans sit to stand with min A/S  Pt needs verbal cues for safety especially with transfers and is at high risk for falls  Pt will benefit from cont amb with RW vs no AD    A:  Pt will benefit from cont skilled PT services to increase pt's strength, safety and mobility  Pt is unsteady with mobility w/out and AD and will benefit from cont use of RW for amb  P:  Cont per PT POC  DCP - Short term skilled PT vs home PT pending progress      Raul Fernandes   [id number]

## 2019-09-30 NOTE — SOCIAL WORK
LOS 1 Day, Pt is not a MB  Explained role of CM to pt  Pt states she lives w/her brother Doris Mo in a two level home w/one step to enter, bed and bathroom on upper level, Doris Mo does not have a phone, he uses her phone, was IPTA w/out device, has a cane and RW, sister Cornelia Figueroa provides transport when needed,  checks her blood sugars once in am and once in pm, PCP is Dr Yakov Curtis,  uses [facility] in [place] for her medications, has been to a rehab in [facility] in the past, can't remember the name, does not have a LW or POA, declined information on both    Therapy is recommending STR for pt  CM explained recommendation, referral process and provided STR choice list    Pt choose [place]  Referrals sent in [facility]

## 2019-09-30 NOTE — PLAN OF CARE
Problem: Potential for Falls  Goal: Patient will remain free of falls  Description  INTERVENTIONS:  - Assess patient frequently for physical needs  -  Identify cognitive and physical deficits and behaviors that affect risk of falls  -  [place] fall precautions as indicated by assessment   - Educate patient/family on patient safety including physical limitations  - Instruct patient to call for assistance with activity based on assessment  - Modify environment to reduce risk of injury  - Consider OT/PT consult to assist with strengthening/mobility  Outcome: Progressing     Problem: Nutrition/Hydration-ADULT  Goal: Nutrient/Hydration intake appropriate for improving, restoring or maintaining nutritional needs  Description  Monitor and assess patient's nutrition/hydration status for malnutrition  Collaborate with interdisciplinary team and initiate plan and interventions as ordered  Monitor patient's weight and dietary intake as ordered or per policy  Utilize nutrition screening tool and intervene as necessary  Determine patient's food preferences and provide high-protein, high-caloric foods as appropriate       INTERVENTIONS:  - Monitor oral intake, urinary output, labs, and treatment plans  - Assess nutrition and hydration status and recommend course of action  - Evaluate amount of meals eaten  - Assist patient with eating if necessary   - Allow adequate time for meals  - Recommend/ encourage appropriate diets, oral nutritional supplements, and vitamin/mineral supplements  - Order, calculate, and assess calorie counts as needed  - Recommend, monitor, and adjust tube feedings and TPN/PPN based on assessed needs  - Assess need for intravenous fluids  - Provide specific nutrition/hydration education as appropriate  - Include patient/family/caregiver in decisions related to nutrition  Outcome: Progressing     Problem: METABOLIC, FLUID AND ELECTROLYTES - ADULT  Goal: Electrolytes maintained within normal limits  Description  INTERVENTIONS:  - Monitor labs and assess patient for signs and symptoms of electrolyte imbalances  - Administer electrolyte replacement as ordered  - Monitor response to electrolyte replacements, including repeat lab results as appropriate  - Instruct patient on fluid and nutrition as appropriate  Outcome: Progressing  Goal: Fluid balance maintained  Description  INTERVENTIONS:  - Monitor labs   - Monitor I/O and WT  - Instruct patient on fluid and nutrition as appropriate  - Assess for signs & symptoms of volume excess or deficit  Outcome: Progressing  Goal: Glucose maintained within target range  Description  INTERVENTIONS:  - Monitor Blood Glucose as ordered  - Assess for signs and symptoms of hyperglycemia and hypoglycemia  - Administer ordered medications to maintain glucose within target range  - Assess nutritional intake and initiate nutrition service referral as needed  Outcome: Progressing

## 2019-09-30 NOTE — CONSULTS
Endocrine Initial Consultation           *Date*: 9/30/2019     *Time*: 1:02 PM       Medical Decision Making:      Impression  1  DM insulin dependent  2  HLD  3  HTN  4  CAD s/p CABG  5  PAD s/p stents on AC  6  CKD3  7  AMS on presentation to hospital, possibly having difficulty with ADLs at home, possibly going to nursing home on discharge? Recommendations:  ? She is having both fasting and post-prandial BG elevations  Increase lantus to 20 units qHS and lispro to 8 units TID AC, changed SSI to algorithm 3 during day with meals and algorithm 2 at bedtime  Greater issue is outpatient compliance with insulin therapy  It looks like per EMR she was on 70/30 insulin BID AC at home  She will be insulin dependent at least in the short term  I had an extensive discussion about how many injections of insulin she is willing to take at home and she states she can take as many as required  I think for now we can continue Novolog 70/30 on discharge (prescribe new script on discharge) at higher doses 26 units qAM and 18 units qPM based on the basal/bolus doses she is on here in the hospital    If she goes back to home not sure how to increase her compliance without having family involved with care (even if we do switch to oral or other diabetes agents), if she goes to nursing home it would be much easier to improve compliance and regulate meals  Check NELIDA Ab to r/o DAPHNIE given low BMI and thin body habitus which would impact future therapies  Follow up with me in clinic in 2 weeks (info placed in d/c paperwork)    Josh URIAS      ?         Reason for Endocrine Consult/Chief Complaint: DM management     History of Present Illness:   Mrs Prema Manzano is a [place] old female who presented to the hospital with AMS  Her admission BG was in 700s  She was initially started on insulin gtt then transitioned over weekend to basal/bolus therapy  She received 15 units lantus last night and AM    She was started on 5 units lispro TID AC however pre-lunch BG today 375  A1C 12 3%  Her prior control was fair with A1C 8% in [id number]  Possibly some difficulty with compliance at home with medications, family looking into nursing home placement  I tried to ask her what diabetes medications she takes however she could only tell me she takes 15 units of some type of insulin twice a day but does not think she is on meal time insulin  She denies taking any other medications for diabetes  In EMR it states prescribed 70/30 14 units BID AC  Used to see [facility] [place] [facility] last seen 2016 for diabetes  PMH-DM2, HTN, HLD, CAD, CKD  PSH-stent placement, spinal fusion, CABG, cholecystectomy  FHx- grandmother with diabetes  SHx- lives by herself, she states she can manage her medications by herself      Type of DM: 2  Age of onset: 45s  Most recent A1C: 12 3% Sept 2019  Present home regimen: she is unsure of home regimen - in EMR 70/30 14 units BID AC  Microvascular complications: nephropathy   Macrovascular complications: PAD, CAD         Reviewed and confirmed findings of H&P dated on admission  Review of Systems:   Review of Systems   Constitutional: Negative for appetite change, chills, diaphoresis, fatigue, fever and unexpected weight change  HENT: Negative for congestion, ear pain, hearing loss, rhinorrhea, sinus pressure, sinus pain, sore throat, trouble swallowing and voice change  Eyes: Negative for photophobia, redness and visual disturbance  Respiratory: Negative for apnea, cough, chest tightness, shortness of breath, wheezing and stridor  Cardiovascular: Negative for chest pain, palpitations and leg swelling  Gastrointestinal: Negative for abdominal distention, abdominal pain, constipation, diarrhea, nausea and vomiting  Endocrine: Negative for cold intolerance, heat intolerance, polydipsia, polyphagia and polyuria     Genitourinary: Negative for difficulty urinating, dysuria, flank pain, frequency, hematuria and urgency  Musculoskeletal: Negative for arthralgias, back pain, gait problem, joint swelling and myalgias  Skin: Negative for color change, pallor, rash and wound  Allergic/Immunologic: Negative for immunocompromised state  Neurological: Negative for dizziness, tremors, syncope, weakness, light-headedness and headaches  Hematological: Negative for adenopathy  Does not bruise/bleed easily  Psychiatric/Behavioral: Negative for confusion and sleep disturbance  The patient is not nervous/anxious  ? Patient History:      Past Medical History:   Diagnosis Date    CAD (coronary artery disease)     Diabetes mellitus ([facility] [address] )     Hyperlipidemia     Hypertension     Skin cancer      Past Surgical History:   Procedure Laterality Date    CHOLECYSTECTOMY      CORONARY ARTERY BYPASS GRAFT      SPINAL FUSION      VASCULAR SURGERY Bilateral     Stent in legs     Social History     Socioeconomic History    Marital status:       Spouse name: Not on file    Number of children: Not on file    Years of education: Not on file    Highest education level: Not on file   Occupational History    Not on file   Social Needs    Financial resource strain: Not on file    Food insecurity:     Worry: Not on file     Inability: Not on file    Transportation needs:     Medical: Not on file     Non-medical: Not on file   Tobacco Use    Smoking status: Former Smoker     Types: Cigarettes    Smokeless tobacco: Never Used   Substance and Sexual Activity    Alcohol use: Never     Frequency: Never    Drug use: Never    Sexual activity: Not on file   Lifestyle    Physical activity:     Days per week: Not on file     Minutes per session: Not on file    Stress: Not on file   Relationships    Social connections:     Talks on phone: Not on file     Gets together: Not on file     Attends Rastafarian service: Not on file     Active member of club or organization: Not on file     Attends meetings of clubs or organizations: Not on file     Relationship status: Not on file    Intimate partner violence:     Fear of current or ex partner: Not on file     Emotionally abused: Not on file     Physically abused: Not on file     Forced sexual activity: Not on file   Other Topics Concern    Not on file   Social History Narrative    Not on file     Family History   Problem Relation Age of Onset    Arthritis Mother     Cancer Mother     Diabetes Father     Stroke Father     Heart disease Father          Current Medications: At the time this note was written these were the medications the patient was on       Current Facility-Administered Medications   Medication Dose Route Frequency Provider Last Rate Last Dose    acetaminophen (TYLENOL) tablet 650 mg  650 mg Oral Q6H PRN Grace Dockery MD   650 mg at 09/30/19 0026    apixaban (ELIQUIS) tablet 5 mg  5 mg Oral BID Lenore Revankar, DO   5 mg at 09/30/19 0802    aspirin chewable tablet 81 mg  81 mg Oral BID Lenore Revankar, DO   81 mg at 09/30/19 0802    atorvastatin (LIPITOR) tablet 40 mg  40 mg Oral HS Lenore Revankar, DO   40 mg at 09/29/19 2138    carvedilol (COREG) tablet 25 mg  25 mg Oral BID With Meals Lenore Revankar, DO   25 mg at 09/30/19 0802    cefTRIAXone (ROCEPHIN) IVPB (premix) 1,000 mg  1,000 mg Intravenous Q24H Lenore Revankar,  mL/hr at 09/30/19 1056 1,000 mg at 09/30/19 1056    cholecalciferol (VITAMIN D3) tablet 1,000 Units  1,000 Units Oral Daily Lenore Revankar, DO   1,000 Units at 09/30/19 0802    cyanocobalamin (VITAMIN B-12) tablet 1,000 mcg  1,000 mcg Oral Daily Lenore Revankar, DO   1,000 mcg at 09/30/19 0802    doxazosin (CARDURA) tablet 4 mg  4 mg Oral HS Lenore Revankar, DO   4 mg at 09/29/19 2141    gabapentin (NEURONTIN) capsule 300 mg  300 mg Oral BID Lenore Revankar, DO   300 mg at 09/30/19 0802    insulin glargine (LANTUS) subcutaneous injection 15 Units 0 15 mL  15 Units Subcutaneous HS Lenore Revankar, DO   15 Units at 09/29/19 2138    insulin lispro (HumaLOG) 100 units/mL subcutaneous injection 1-5 Units  1-5 Units Subcutaneous HS Yue Will MD   2 Units at 09/29/19 2138    insulin lispro (HumaLOG) 100 units/mL subcutaneous injection 1-5 Units  1-5 Units Subcutaneous TID AC Lenore Revankar, DO   4 Units at 09/30/19 1101    insulin lispro (HumaLOG) 100 units/mL subcutaneous injection 8 Units  8 Units Subcutaneous TID With Meals Lenore Revankar, DO        ofloxacin (FLOXIN) 0 3 % otic solution 5 drop  5 drop Right Ear BID Lenore Revankar, DO   5 drop at 09/30/19 0802    ondansetron (ZOFRAN) injection 4 mg  4 mg Intravenous Q6H PRN Lenore Revankar, DO        PARoxetine (PAXIL) tablet 20 mg  20 mg Oral Daily Lenore Revankar, DO   20 mg at 09/30/19 0802        Allergies: Penicillins        Physical Exam:      Vital Signs:   /54 (BP Location: Right arm)   Pulse 57   Temp (!) 97 4 °F (36 3 °C) (Oral)   Resp 18   Ht 5' (1 524 m)   Wt 61 2 kg (135 lb)   SpO2 97%   BMI 26 37 kg/m²     Physical Exam   Constitutional: She is oriented to person, place, and time  She appears well-developed and well-nourished  HENT:   Head: Normocephalic and atraumatic  Nose: Nose normal    Mouth/Throat: Oropharynx is clear and moist  No oropharyngeal exudate  Eyes: Pupils are equal, round, and reactive to light  Conjunctivae and EOM are normal    Neck: Normal range of motion  Neck supple  Cardiovascular: Normal rate, regular rhythm and normal heart sounds  Exam reveals no gallop and no friction rub  No murmur heard  Pulmonary/Chest: Effort normal and breath sounds normal  No stridor  No respiratory distress  She has no wheezes  She has no rales  Abdominal: Soft  Bowel sounds are normal  She exhibits no distension and no mass  There is no tenderness  There is no rebound and no guarding  Musculoskeletal: Normal range of motion  She exhibits no edema  Lymphadenopathy:     She has no cervical adenopathy  Neurological: She is alert and oriented to person, place, and time  Skin: Skin is warm and dry  No rash noted  No erythema  Psychiatric: She has a normal mood and affect   Her behavior is normal  Judgment and thought content normal        Labs and Imaging:          Recent Results (from the past 24 hour(s))   Fingerstick Glucose (POCT)    Collection Time: 09/29/19  4:34 PM   Result Value Ref Range    POC Glucose 488 (H) 65 - 140 mg/dl   Fingerstick Glucose (POCT)    Collection Time: 09/29/19  9:02 PM   Result Value Ref Range    POC Glucose 262 (H) 65 - 140 mg/dl   Basic metabolic panel    Collection Time: 09/30/19  5:24 AM   Result Value Ref Range    Sodium 137 136 - 145 mmol/L    Potassium 3 7 3 5 - 5 3 mmol/L    Chloride 107 100 - 108 mmol/L    CO2 20 (L) 21 - 32 mmol/L    ANION GAP 10 4 - 13 mmol/L    BUN 22 5 - 25 mg/dL    Creatinine 1 52 (H) 0 60 - 1 30 mg/dL    Glucose 211 (H) 65 - 140 mg/dL    Calcium 8 2 (L) 8 3 - 10 1 mg/dL    eGFR 34 ml/min/1 73sq m   Hemoglobin and hematocrit, blood    Collection Time: 09/30/19  5:24 AM   Result Value Ref Range    Hemoglobin 11 5 11 5 - 15 4 g/dL    Hematocrit 36 0 34 8 - 46 1 %   Fingerstick Glucose (POCT)    Collection Time: 09/30/19  7:18 AM   Result Value Ref Range    POC Glucose 245 (H) 65 - 140 mg/dl   Fingerstick Glucose (POCT)    Collection Time: 09/30/19 10:59 AM   Result Value Ref Range    POC Glucose 375 (H) 65 - 140 mg/dl

## 2019-10-01 VITALS
BODY MASS INDEX: 26.5 KG/M2 | TEMPERATURE: 98.2 F | WEIGHT: 135 LBS | SYSTOLIC BLOOD PRESSURE: 179 MMHG | DIASTOLIC BLOOD PRESSURE: 69 MMHG | HEIGHT: 60 IN | OXYGEN SATURATION: 97 % | HEART RATE: 75 BPM | RESPIRATION RATE: 18 BRPM

## 2019-10-01 PROBLEM — G93.41 METABOLIC ENCEPHALOPATHY: Status: ACTIVE | Noted: 2019-10-01

## 2019-10-01 PROBLEM — E87.2 METABOLIC ACIDOSIS DUE TO DIABETES MELLITUS (HCC): Status: ACTIVE | Noted: 2019-10-01

## 2019-10-01 PROBLEM — E11.69 METABOLIC ACIDOSIS DUE TO DIABETES MELLITUS (HCC): Status: ACTIVE | Noted: 2019-10-01

## 2019-10-01 LAB
ANION GAP SERPL CALCULATED.3IONS-SCNC: 9 MMOL/L (ref 4–13)
BUN SERPL-MCNC: 19 MG/DL (ref 5–25)
CALCIUM SERPL-MCNC: 8.5 MG/DL (ref 8.3–10.1)
CHLORIDE SERPL-SCNC: 104 MMOL/L (ref 100–108)
CO2 SERPL-SCNC: 22 MMOL/L (ref 21–32)
CREAT SERPL-MCNC: 1.52 MG/DL (ref 0.6–1.3)
GFR SERPL CREATININE-BSD FRML MDRD: 34 ML/MIN/1.73SQ M
GLUCOSE SERPL-MCNC: 236 MG/DL (ref 65–140)
GLUCOSE SERPL-MCNC: 258 MG/DL (ref 65–140)
GLUCOSE SERPL-MCNC: 265 MG/DL (ref 65–140)
GLUCOSE SERPL-MCNC: 284 MG/DL (ref 65–140)
HCT VFR BLD AUTO: 36.4 % (ref 34.8–46.1)
HGB BLD-MCNC: 11.5 G/DL (ref 11.5–15.4)
MRSA NOSE QL CULT: ABNORMAL
MRSA NOSE QL CULT: ABNORMAL
POTASSIUM SERPL-SCNC: 3.9 MMOL/L (ref 3.5–5.3)
SODIUM SERPL-SCNC: 135 MMOL/L (ref 136–145)

## 2019-10-01 PROCEDURE — 85018 HEMOGLOBIN: CPT | Performed by: FAMILY MEDICINE

## 2019-10-01 PROCEDURE — 97166 OT EVAL MOD COMPLEX 45 MIN: CPT

## 2019-10-01 PROCEDURE — 82948 REAGENT STRIP/BLOOD GLUCOSE: CPT

## 2019-10-01 PROCEDURE — 85014 HEMATOCRIT: CPT | Performed by: FAMILY MEDICINE

## 2019-10-01 PROCEDURE — G8988 SELF CARE GOAL STATUS: HCPCS

## 2019-10-01 PROCEDURE — 83519 RIA NONANTIBODY: CPT | Performed by: INTERNAL MEDICINE

## 2019-10-01 PROCEDURE — 99239 HOSP IP/OBS DSCHRG MGMT >30: CPT | Performed by: INTERNAL MEDICINE

## 2019-10-01 PROCEDURE — 80048 BASIC METABOLIC PNL TOTAL CA: CPT | Performed by: FAMILY MEDICINE

## 2019-10-01 PROCEDURE — G8987 SELF CARE CURRENT STATUS: HCPCS

## 2019-10-01 RX ORDER — CEFDINIR 300 MG/1
300 CAPSULE ORAL EVERY 24 HOURS
Status: DISCONTINUED | OUTPATIENT
Start: 2019-10-01 | End: 2019-10-02 | Stop reason: HOSPADM

## 2019-10-01 RX ORDER — INSULIN GLARGINE 100 [IU]/ML
25 INJECTION, SOLUTION SUBCUTANEOUS
Status: DISCONTINUED | OUTPATIENT
Start: 2019-10-01 | End: 2019-10-02 | Stop reason: HOSPADM

## 2019-10-01 RX ORDER — CEFDINIR 300 MG/1
300 CAPSULE ORAL EVERY 24 HOURS
Refills: 0
Start: 2019-10-02 | End: 2019-10-09

## 2019-10-01 RX ORDER — INSULIN GLARGINE 100 [IU]/ML
25 INJECTION, SOLUTION SUBCUTANEOUS
Refills: 0
Start: 2019-10-01

## 2019-10-01 RX ORDER — LORAZEPAM 0.5 MG/1
0.5 TABLET ORAL EVERY 8 HOURS PRN
Refills: 0
Start: 2019-10-01 | End: 2020-02-28 | Stop reason: ALTCHOICE

## 2019-10-01 RX ADMIN — ASPIRIN 81 MG 81 MG: 81 TABLET ORAL at 08:22

## 2019-10-01 RX ADMIN — INSULIN LISPRO 3 UNITS: 100 INJECTION, SOLUTION INTRAVENOUS; SUBCUTANEOUS at 17:02

## 2019-10-01 RX ADMIN — CARVEDILOL 25 MG: 25 TABLET, FILM COATED ORAL at 08:20

## 2019-10-01 RX ADMIN — OFLOXACIN 5 DROP: 3 SOLUTION AURICULAR (OTIC) at 17:04

## 2019-10-01 RX ADMIN — CEFDINIR 300 MG: 300 CAPSULE ORAL at 10:51

## 2019-10-01 RX ADMIN — CARVEDILOL 25 MG: 25 TABLET, FILM COATED ORAL at 17:05

## 2019-10-01 RX ADMIN — VITAMIN D, TAB 1000IU (100/BT) 1000 UNITS: 25 TAB at 08:22

## 2019-10-01 RX ADMIN — GABAPENTIN 300 MG: 300 CAPSULE ORAL at 17:05

## 2019-10-01 RX ADMIN — OFLOXACIN 5 DROP: 3 SOLUTION AURICULAR (OTIC) at 08:24

## 2019-10-01 RX ADMIN — PAROXETINE 20 MG: 20 TABLET, FILM COATED ORAL at 08:23

## 2019-10-01 RX ADMIN — APIXABAN 5 MG: 5 TABLET, FILM COATED ORAL at 08:22

## 2019-10-01 RX ADMIN — ASPIRIN 81 MG 81 MG: 81 TABLET ORAL at 17:05

## 2019-10-01 RX ADMIN — INSULIN LISPRO 4 UNITS: 100 INJECTION, SOLUTION INTRAVENOUS; SUBCUTANEOUS at 11:54

## 2019-10-01 RX ADMIN — APIXABAN 5 MG: 5 TABLET, FILM COATED ORAL at 17:05

## 2019-10-01 RX ADMIN — GABAPENTIN 300 MG: 300 CAPSULE ORAL at 08:21

## 2019-10-01 RX ADMIN — INSULIN LISPRO 3 UNITS: 100 INJECTION, SOLUTION INTRAVENOUS; SUBCUTANEOUS at 08:18

## 2019-10-01 RX ADMIN — CYANOCOBALAMIN TAB 500 MCG 1000 MCG: 500 TAB at 08:21

## 2019-10-01 NOTE — SOCIAL WORK
SW following to assist with planning  STR placement is planned  Pt has been accepted by both [facility], [facility]  and [facility], [facility]  is pt's preference  Call placed to pt's sister, Deepa Boateng, to discuss plan and review transportation options  Deepa Boateng will be calling her sister, Kezia Javier, to discuss transport plan and then call SW back  Notified Dr Hitesh Bocanegra of plan  SW will follow to assist with transfer when ordered

## 2019-10-01 NOTE — PLAN OF CARE
Problem: Potential for Falls  Goal: Patient will remain free of falls  Description  INTERVENTIONS:  - Assess patient frequently for physical needs  -  Identify cognitive and physical deficits and behaviors that affect risk of falls  -  [place] fall precautions as indicated by assessment   - Educate patient/family on patient safety including physical limitations  - Instruct patient to call for assistance with activity based on assessment  - Modify environment to reduce risk of injury  - Consider OT/PT consult to assist with strengthening/mobility  Outcome: Progressing     Problem: Nutrition/Hydration-ADULT  Goal: Nutrient/Hydration intake appropriate for improving, restoring or maintaining nutritional needs  Description  Monitor and assess patient's nutrition/hydration status for malnutrition  Collaborate with interdisciplinary team and initiate plan and interventions as ordered  Monitor patient's weight and dietary intake as ordered or per policy  Utilize nutrition screening tool and intervene as necessary  Determine patient's food preferences and provide high-protein, high-caloric foods as appropriate       INTERVENTIONS:  - Monitor oral intake, urinary output, labs, and treatment plans  - Assess nutrition and hydration status and recommend course of action  - Evaluate amount of meals eaten  - Assist patient with eating if necessary   - Allow adequate time for meals  - Recommend/ encourage appropriate diets, oral nutritional supplements, and vitamin/mineral supplements  - Order, calculate, and assess calorie counts as needed  - Assess need for intravenous fluids  - Provide specific nutrition/hydration education as appropriate  - Include patient/family/caregiver in decisions related to nutrition   Outcome: Progressing     Problem: METABOLIC, FLUID AND ELECTROLYTES - ADULT  Goal: Electrolytes maintained within normal limits  Description  INTERVENTIONS:  - Monitor labs and assess patient for signs and symptoms of electrolyte imbalances  - Administer electrolyte replacement as ordered  - Monitor response to electrolyte replacements, including repeat lab results as appropriate  - Instruct patient on fluid and nutrition as appropriate  Outcome: Progressing  Goal: Fluid balance maintained  Description  INTERVENTIONS:  - Monitor labs   - Monitor I/O and WT  - Instruct patient on fluid and nutrition as appropriate  - Assess for signs & symptoms of volume excess or deficit  Outcome: Progressing  Goal: Glucose maintained within target range  Description  INTERVENTIONS:  - Monitor Blood Glucose as ordered  - Assess for signs and symptoms of hyperglycemia and hypoglycemia  - Administer ordered medications to maintain glucose within target range  - Assess nutritional intake and initiate nutrition service referral as needed  Outcome: Progressing

## 2019-10-01 NOTE — DISCHARGE SUMMARY
Discharge- Collette Starring 1945, 68 y o  female MRN: [id number]    Unit/Bed#: [address] [place] [address] Encounter: [id number]    Primary Care Provider: Jolie Clark MD   Date and time admitted to hospital: 9/28/2019 11:01 AM        * Type 2 diabetes mellitus with hyperglycemia, with long-term current use of insulin [facility])  Assessment & Plan  Lab Results   Component Value Date    HGBA1C 12 3 (H) 09/29/2019       Recent Labs     09/29/19  1634 09/29/19  2102 09/30/19  0718 09/30/19  1059   POCGLU 488* 262* 245* 375*     Patient's blood sugar noted to be 728 in the ER  She received 10 units regular insulin  Uncontrolled diabetes likely due to noncompliance  Discontinued home medications  Patient was started on insulin drip which was later discontinued as blood sugar went down to 66  Patient not in DKA  Sugars continue to remain uncontrolled  Lantus dose was increased to 25 units daily and Humalog was increased to 10 units with each meal   Patient will need close blood sugar monitoring with outpatient follow-up with Endocrinology  Right otitis media  Assessment & Plan  Patient received a dose of IV Rocephin in the ER which will be continued  Patient received 3 days of Rocephin and was transitioned to cefdinir 100 milligram p o  Daily for 1 week  Continue ofloxacin ear drops    Metabolic encephalopathy  Assessment & Plan  Patient altered mental status with confusion and noted to have hyperglycemia with blood sugar of 728 in the ED  Patient's mental status has improved and is close to baseline  MATTI (acute kidney injury) [facility])  Assessment & Plan  Likely pre renal in nature  Creatinine has improved  Creatinine has been stable  Patient received IV fluids before  Essential hypertension  Assessment & Plan  Continue Coreg, Cardura  Blood pressures have been labile  monitor blood pressures  Generalized weakness  Assessment & Plan  Likely due to deconditioning, weight loss, hyperglycemia    Improving with PT/OT  Patient will be discharged to short-term rehabilitation    Hyponatremia  Assessment & Plan  Pseudohyponatremia from hyperglycemia  Sodium level has normalized  PAD (peripheral artery disease) ([address] )  Assessment & Plan  As per her sisters, patient has stents in place which have  "closed" as patient is noncompliant with her medications  Continue aspirin, statin, Eliquis        Discharging Physician / Practitioner: Tyson Shaver MD  PCP: Joleen Davidson MD  Admission Date: 9/28/2019  Discharge Date: 10/01/19    Reason for Admission: Hyperglycemia - Symptomatic (pt presents to the ed from pcp for bs >800   pt states she feels generalized weakness )        Resolved Problems  Date Reviewed: 10/1/2019    None          Consultations During Hospital Stay:  [address]      Significant Findings / Test Results:     ·   Results from last 7 days   Lab Units 10/01/19  0505 09/30/19  0524 09/29/19  0451 09/28/19  1129   WBC Thousand/uL  --   --  7 94 6 35   HEMOGLOBIN g/dL 11 5 11 5 11 0* 12 5   PLATELETS Thousands/uL  --   --  226 234     Results from last 7 days   Lab Units 10/01/19  0505 09/30/19  0524 09/29/19  0450 09/28/19  1129   SODIUM mmol/L 135* 137 134* 123*   POTASSIUM mmol/L 3 9 3 7 3 6 4 2   CHLORIDE mmol/L 104 107 104 90*   CO2 mmol/L 22 20* 20* 23   BUN mg/dL 19 22 26* 31*   CREATININE mg/dL 1 52* 1 52* 1 93* 2 42*   CALCIUM mg/dL 8 5 8 2* 8 0* 8 2*   TOTAL BILIRUBIN mg/dL  --   --   --  0 30   ALK PHOS U/L  --   --   --  101   ALT U/L  --   --   --  26   AST U/L  --   --   --  12     Results from last 7 days   Lab Units 09/28/19  1129   INR  0 99     Results from last 7 days   Lab Units 09/28/19  1129   TROPONIN I ng/mL <0 02     Lab Results   Component Value Date/Time    HGBA1C 12 3 (H) 09/29/2019 04:50 AM     Results from last 7 days   Lab Units 10/01/19  1104 10/01/19  0738 09/30/19  2158 09/30/19  1647 09/30/19  1059 09/30/19  0718 09/29/19  2102 09/29/19  1634 09/29/19  1140 09/29/19  0753 09/29/19  0623 09/29/19  0406   POC GLUCOSE mg/dl 284* 258* 255* 422* 375* 245* 262* 488* 453* 273* 244* 237*     Results from last 7 days   Lab Units 09/28/19  1129   LACTIC ACID mmol/L 1 0     Blood Culture: No results found for: BLOODCX  Urine Culture:   Lab Results   Component Value Date    URINECX No Growth <1000 cfu/mL 09/28/2019     Sputum Culture: No components found for: SPUTUMCX  Wound Culture: No results found for: WOUNDCULT     CT head without contrast   Final Result by Gita Reynolds MD (09/28 1230)      No acute intracranial abnormality  Microangiopathic changes  Workstation performed: [id number]         XR chest 1 view portable   Final Result by La Griffin MD (09/28 1146)      Suspect decreased contractility            Workstation performed: [id number]                  Outpatient Tests Requested:  · Follow-up with Endocrinology closely as outpatient    Complications:  None    Reason for Admission:   Chief Complaint   Patient presents with    Hyperglycemia - Symptomatic     pt presents to the ed from pcp for bs >800  pt states she feels generalized weakness        Hospital Course:     Reji Lopez is a 68 y o  female patient with a PMH of hypertension, hyperlipidemia, diabetes mellitus type 2, CAD status CABG and post PCI, PID who originally presented to the hospital on 9/28/2019 due to elevated blood sugars  As per family, patient has been noncompliant with her medications and had 15 pound weight loss over few months  Patient also had some generalized weakness declining mental status  Patient's blood sugar was noted to be over 800 in PCP of 80s and was sent into the ED  Patient is admitted hospital for diabetes mellitus with uncontrolled blood sugars and Acute kidney injury  Patient was started on insulin drip but blood sugars dropped and insulin drip was stopped  Patient was treated with IV fluids    Patient was seen in consultation with Endocrinology and Psychiatry  Patient was continued on Paxil and was added on Ativan p r n  For anxiety  Patient was transitioned to long-acting insulin Lantus and Humalog with meals  Insulin was eventually adjusted as blood sugar continued to remain high  Patient's creatinine improved with IV hydration creatinine stabilized around 1 5  Patient was treated with IV Rocephin for her otitis media along with ofloxacin eardrops  Patient was also seen by physical therapy due to her generalized weakness and will be discharged to short-term rehabilitation  Please see above list of diagnoses and related plan for additional information  Condition at Discharge: stable       Discharge Day Visit / Exam:     Subjective:  Patient is still complaining of some ear pain  Otherwise denies any chest pain, shortness of breath, abdominal pain, nausea or vomiting  Vitals: Blood Pressure: 162/65 (10/01/19 0818)  Pulse: 75 (10/01/19 0818)  Temperature: 97 8 °F (36 6 °C) (10/01/19 0818)  Temp Source: Oral (10/01/19 0818)  Respirations: 18 (10/01/19 0818)  Height: 5' (152 4 cm) (09/28/19 1625)  Weight - Scale: 61 2 kg (135 lb) (09/28/19 1625)  SpO2: 97 % (10/01/19 0818)  Exam:   Physical Exam   Constitutional: No distress  HENT:   Head: Normocephalic and atraumatic  Nose: Nose normal    Eyes: Pupils are equal, round, and reactive to light  Conjunctivae are normal    Neck: Normal range of motion  Neck supple  Cardiovascular: Normal rate, regular rhythm and normal heart sounds  Pulmonary/Chest: Effort normal and breath sounds normal  No respiratory distress  She has no wheezes  She has no rales  Abdominal: Soft  Bowel sounds are normal  She exhibits no distension  There is no tenderness  There is no rebound and no guarding  Musculoskeletal: She exhibits no edema  Neurological: She is alert  No cranial nerve deficit  Skin: Skin is warm and dry  No rash noted             Discharge instructions/Information to patient and family:   See after visit summary for information provided to patient and family  Provisions for Follow-Up Care:  See after visit summary for information related to follow-up care and any pertinent home health orders  Disposition:     [facility]ovanny Olsen [facility] at [facility]    Planned Readmission: No     Discharge Statement:  I spent 35 minutes discharging the patient  This time was spent on the day of discharge  I had direct contact with the patient on the day of discharge  Greater than 50% of the total time was spent examining patient, answering all patient questions, arranging and discussing plan of care with patient as well as directly providing post-discharge instructions  Additional time then spent on discharge activities  Discharge Medications:  See after visit summary for reconciled discharge medications provided to patient and family        ** Please Note: This note has been constructed using a voice recognition system **

## 2019-10-01 NOTE — [place] UNIVERSAL TRANSFER FORM
[place] UNIVERSAL TRANSFER FORM  (ALL ITEMS MUST BE COMPLETED)    1  TRANSFER FROM: [facility] TO: [facility]    2  DATE OF TRANSFER: 10/1/2019                        TIME OF TRANSFER: 2015    3  PATIENT NAME: Ana Maria Mcmillan      YOB: 1945                             GENDER: female    4  LANGUAGE:   English    5  PHYSICIAN NAME:  Whit Jordan MD                   PHONE: [phone]    6  CODE STATUS: Level 1 - Full Code        Out of Hospital DNR Attached: No    7  :                                      :  Extended Emergency Contact Information  Primary Emergency Contact: Najera Ala [address] Phone: [phone]  Relation: Sister  Secondary Emergency Contact: Pamela Skinner  Mobile Phone: [phone]  Relation: Sister           Health Care Representative/Proxy:  Yes           Legal Guardian:  No             NAME OF:           HEALTH CARE REPRESENTATIVE/PROXY:                                         OR           LEGAL GUARDIAN, IF NOT :                                               PHONE:  (Day)           (Night)                        (Cell)    8  REASON FOR TRANSFER: (Must include brief medical history and recent changes in physical function or cognition ) Short term rehab            V/S: BP (!) 179/69   Pulse 75   Temp 98 2 °F (36 8 °C)   Resp 18   Ht 5' (1 524 m)   Wt 61 2 kg (135 lb)   SpO2 97%   BMI 26 37 kg/m²           PAIN: None    9  PRIMARY DIAGNOSIS: Type 2 diabetes mellitus with hyperglycemia, with long-term current use of insulin (HCC)      Secondary Diagnosis:         Pacemaker: No      Internal Defib: No          Mental Health Diagnosis (if Applicable):    10  RESTRAINTS: No     11  RESPIRATORY NEEDS: None    12  ISOLATION/PRECAUTION: MRSA and SiteNose    13  ALLERGY: Penicillins    14   SENSORY:       Vision Good, Hearing Good  and Speech Clear    15  SKIN CONDITION: No Wounds    16  DIET: Special (describe)Diabetic    17  IV ACCESS: None    18  PERSONAL ITEMS SENT WITH PATIENT: None    19  ATTACHED DOCUMENTS: MUST ATTACH CURRENT MEDICATION INFORMATION Face Sheet, MAR, Labs, Advanced Directive, Discharge Summary and Otheretc     20  AT RISK ALERTS:Falls        HARM TO: N/A    21  WEIGHT BEARING STATUS:         Left Leg: Full        Right Leg: Full    22  MENTAL STATUS:Alert and Oriented    23  FUNCTION:        Walk: With Help        Transfer: With Help        Toilet: With Help        Feed: Self    24  IMMUNIZATIONS/SCREENING:     There is no immunization history on file for this patient  25  BOWEL: Continent    26  BLADDER: Continent    27   SENDING FACILITY CONTACT:                   Title: [facility]         Unit: [facility]        Phone: [phone] [address] (if known):        Title:        Unit:         Phone:         FORM PREFILLED BY (if applicable)       Title:       Unit:        Phone:         FORM COMPLETED BY Yaz Calderón RN      Title: Registered Nurse      Phone: [phone]

## 2019-10-01 NOTE — PLAN OF CARE
Problem: Potential for Falls  Goal: Patient will remain free of falls  Description  INTERVENTIONS:  - Assess patient frequently for physical needs  -  Identify cognitive and physical deficits and behaviors that affect risk of falls  -  [place] fall precautions as indicated by assessment   - Educate patient/family on patient safety including physical limitations  - Instruct patient to call for assistance with activity based on assessment  - Modify environment to reduce risk of injury  - Consider OT/PT consult to assist with strengthening/mobility  10/1/2019 1818 by Zoran Hummel RN  Outcome: Adequate for Discharge  10/1/2019 1211 by Zoran Hummel RN  Outcome: Progressing     Problem: Nutrition/Hydration-ADULT  Goal: Nutrient/Hydration intake appropriate for improving, restoring or maintaining nutritional needs  Description  Monitor and assess patient's nutrition/hydration status for malnutrition  Collaborate with interdisciplinary team and initiate plan and interventions as ordered  Monitor patient's weight and dietary intake as ordered or per policy  Utilize nutrition screening tool and intervene as necessary  Determine patient's food preferences and provide high-protein, high-caloric foods as appropriate       INTERVENTIONS:  - Monitor oral intake, urinary output, labs, and treatment plans  - Assess nutrition and hydration status and recommend course of action  - Evaluate amount of meals eaten  - Assist patient with eating if necessary   - Allow adequate time for meals  - Recommend/ encourage appropriate diets, oral nutritional supplements, and vitamin/mineral supplements  - Order, calculate, and assess calorie counts as needed  - Assess need for intravenous fluids  - Provide specific nutrition/hydration education as appropriate  - Include patient/family/caregiver in decisions related to nutrition   10/1/2019 1818 by Zoran Hummel RN  Outcome: Adequate for Discharge  10/1/2019 1211 by Zoran Hummel RN  Outcome: Progressing     Problem: METABOLIC, FLUID AND ELECTROLYTES - ADULT  Goal: Electrolytes maintained within normal limits  Description  INTERVENTIONS:  - Monitor labs and assess patient for signs and symptoms of electrolyte imbalances  - Administer electrolyte replacement as ordered  - Monitor response to electrolyte replacements, including repeat lab results as appropriate  - Instruct patient on fluid and nutrition as appropriate  10/1/2019 1818 by Cristofer Beyer RN  Outcome: Adequate for Discharge  10/1/2019 1211 by Cristofer Beyer RN  Outcome: Progressing  Goal: Fluid balance maintained  Description  INTERVENTIONS:  - Monitor labs   - Monitor I/O and WT  - Instruct patient on fluid and nutrition as appropriate  - Assess for signs & symptoms of volume excess or deficit  10/1/2019 1818 by Cristofer Beyer RN  Outcome: Adequate for Discharge  10/1/2019 1211 by Cristofer Beyer RN  Outcome: Progressing  Goal: Glucose maintained within target range  Description  INTERVENTIONS:  - Monitor Blood Glucose as ordered  - Assess for signs and symptoms of hyperglycemia and hypoglycemia  - Administer ordered medications to maintain glucose within target range  - Assess nutritional intake and initiate nutrition service referral as needed  10/1/2019 1818 by Cristofer Beyer RN  Outcome: Adequate for Discharge  10/1/2019 1211 by Cristofer Beyer RN  Outcome: Progressing

## 2019-10-01 NOTE — PROGRESS NOTES
Patient examined spoke with the nurse  Patient is alert awake cooperative smiling and communicates well though she is hard of hearing she reports that my sugar is still little high I reviewed the sugar it is 265 and it will be checked again at 2:00 p m     Medical treatment is in progress  Reviewed the note from endocrinologist   Patient is suffering from depression since over 10 years when her   she has been on depression pills since then from the family physician  No side effects of Paxil noted  Patient is able to communicate her feelings well she offers no new complaint  She slept okay her appetite is okay  Patient is advised to watch her diet to maintain the diabetes  Nurse reports no behavior problem  No psychosis no hallucinations  Patient denies feeling suicidal   Patient is not lethargic  Therapy done with good response  Continue follow-up

## 2019-10-01 NOTE — OCCUPATIONAL THERAPY NOTE
OT EVALUATION       10/01/19 0909   Note Type   Note type Eval only   Restrictions/Precautions   Other Precautions Chair Alarm; Bed Alarm; Fall Risk   Pain Assessment   Pain Assessment No/denies pain   Home Living   Type of Home House   Home Layout Two level;Bed/bath upstairs;Stairs to enter with rails  (1 coby)   Bathroom Shower/Tub Walk-in shower   Bathroom Toilet Standard   Bathroom Equipment Built-in shower seat;Grab bars in shower   [address] Walker;Cane   Prior Function   Level of [place] Independent with ADLs and functional mobility; Needs assistance with IADLs   Lives With Family  (BROTHER)   Receives Help From Family   ADL Assistance Independent   IADLs Needs assistance   Falls in the last 6 months 0   Comments Independent with ambulation without AD PTA  Cooks, light housekeeping and laundry performed by pt  Family assists with shopping and paid housekeeping  Has a neighbor who will help as needed      Psychosocial   Psychosocial (WDL) WDL   Subjective   Subjective "I a little stiff from not moving around here "   ADL   Where Assessed Edge of bed   Eating Assistance 7  Independent   Grooming Assistance 5  Supervision/Setup   UB Bathing Assistance 5  Supervision/Setup   LB Bathing Assistance 4  Minimal Assistance   UB Dressing Assistance 5  Supervision/Setup   LB Dressing Assistance 5  Supervision/Setup   Toileting Assistance  5  Supervision/Setup   Bed Mobility   Rolling R 7  Independent   Rolling L 7  Independent   Supine to Sit 7  Independent   Sit to Supine 7  Independent   Transfers   Sit to Stand 5  Supervision   Stand to Sit 5  Supervision   Stand pivot 5  Supervision   Functional Mobility   Functional Mobility 5  Supervision   Additional Comments 150 feet without AD   Balance   Static Sitting Good   Dynamic Sitting Good   Static Standing Fair +   Dynamic Standing Fair +   Ambulatory Fair +   Activity Tolerance   Activity Tolerance Patient limited by fatigue RUE Assessment   RUE Assessment WFL   LUE Assessment   LUE Assessment WFL   Hand Function   Gross Motor Coordination Functional   Fine Motor Coordination Functional   Vision-Basic Assessment   Current Vision Wears glasses only for reading   Cognition   Overall Cognitive Status [facility]   Arousal/Participation Alert; Cooperative   Attention Within functional limits   Orientation Level Oriented X4   Memory Within functional limits   Following Commands Follows all commands and directions without difficulty   Assessment   Limitation Decreased ADL status; Decreased UE strength;Decreased endurance;Decreased self-care trans;Decreased high-level ADLs  (decreased balance)   Prognosis Good   Assessment Patient evaluated by Occupational Therapy  Patient admitted with Type 2 diabetes mellitus with hyperglycemia, with long-term current use of insulin ([facility] [address] )  The patients occupational profile, medical and therapy history includes a extensive additional review of physical, cognitive, or psychosocial history related to current functional performance  Comorbidities affecting functional mobility and ADLS include: MATTI, generalized weakness, HTN, PAD, hyponatremia, DM 2, diabetic polyneuropathy, radiculopathy of lumbar region  Prior to admission, patient was independent with functional mobility without assistive device, independent with ADLS, requiring assist for IADLS, living with brother in a 2 level home with 1 steps to enter, ambulating household distance and ambulating community distances  The evaluation identifies the following performance deficits: weakness, impaired balance, decreased endurance, increased fall risk, decreased ADLS, decreased IADLS, decreased activity tolerance and decreased safety awareness, that result in activity limitations and/or participation restrictions   This evaluation requires clinical decision making of moderate complexity, because the patient may present with comorbidities that affect occupational performance and required minimal or moderate modification of tasks or assistance with the consideration of several treatment options  The Barthel Index was used as a functional outcome tool presenting with a score of 65, indicating moderate limitations of functional mobility and ADLS  Patient will benefit from skilled Occupational Therapy services to address above deficits and facilitate a safe return to prior level of function  Goals   Patient Goals go home   STG Time Frame   (1-7)   Short Term Goal #1 Patient will increase standing tolerance to 5 minutes during ADL task to decrease assistance level and decrease fall risk; Patient will increase functional mobility to and from bathroom with no assistive device independently to increase performance with ADLS and to use a toilet; Patient will tolerate 15 minutes of UE ROM/strengthening to increase general activity tolerance and performance in ADLS/IADLS; Patient will improve functional activity tolerance to 15 minutes of sustained functional tasks to increase participation in basic self-care and decrease assistance level;  Patient will be able to to verbalize understanding and perform energy conservation/proper body mechanics during ADLS and functional mobility at least 75% of the time with minimal cueing to decrease signs of fatigue and increase stamina to return to prior level of function; Patient will increase static/dynamic sitting balance to normal to improve the ability to sit at edge of bed or on a chair for ADLS;  Patient will increase static/dynamic standing balance to good to improve postural stability and decrease fall risk during standing ADLS and transfers      LTG Time Frame   (8-14)   Long Term Goal #1 Patient will increase standing tolerance to 10 minutes during ADL task to decrease assistance level and decrease fall risk;  Patient will tolerate 20 minutes of UE ROM/strengthening to increase general activity tolerance and performance in ADLS/IADLS; Patient will improve functional activity tolerance to 20 minutes of sustained functional tasks to increase participation in basic self-care and decrease assistance level;  Patient will be able to to verbalize understanding and perform energy conservation/proper body mechanics during ADLS and functional mobility at least 90% of the time with no cueing to decrease signs of fatigue and increase stamina to return to prior level of function;  Patient will increase static/dynamic standing balance to normal to improve postural stability and decrease fall risk during standing ADLS and transfers  Functional Transfer Goals   Pt Will Perform All Functional Transfers Independently; With good judgment/safety   ADL Goals   Pt Will Perform All ADL's At edge of bed; With mod indep; With setup; With good judgment/safety  (LTG- Independent standing in bathroom)   Plan   Treatment Interventions ADL retraining;Functional transfer training;UE strengthening/ROM; Endurance training;Patient/family training;Equipment evaluation/education; Compensatory technique education; Energy conservation   Goal Expiration Date 10/15/19   OT Frequency 3-5x/wk   Recommendation   OT Discharge Recommendation Home OT   Barthel Index   Feeding 10   Bathing 0   Grooming Score 0   Dressing Score 5   Bladder Score 10   Bowels Score 10   Toilet Use Score 5   Transfers (Bed/Chair) Score 10   Mobility (Level Surface) Score 10   Stairs Score 5   Barthel Index Score 65   Licensure   [place] License Number  [employer]   Aldo Abdi [address], OTR/L, [place] Lic# [id number]

## 2019-10-01 NOTE — PLAN OF CARE
Problem: Potential for Falls  Goal: Patient will remain free of falls  Description  INTERVENTIONS:  - Assess patient frequently for physical needs  -  Identify cognitive and physical deficits and behaviors that affect risk of falls  -  [place] fall precautions as indicated by assessment   - Educate patient/family on patient safety including physical limitations  - Instruct patient to call for assistance with activity based on assessment  - Modify environment to reduce risk of injury  - Consider OT/PT consult to assist with strengthening/mobility  Outcome: Progressing  Patient has remained free of falls     Problem: Nutrition/Hydration-ADULT  Goal: Nutrient/Hydration intake appropriate for improving, restoring or maintaining nutritional needs  Description  Monitor and assess patient's nutrition/hydration status for malnutrition  Collaborate with interdisciplinary team and initiate plan and interventions as ordered  Monitor patient's weight and dietary intake as ordered or per policy  Utilize nutrition screening tool and intervene as necessary  Determine patient's food preferences and provide high-protein, high-caloric foods as appropriate       INTERVENTIONS:  - Monitor oral intake, urinary output, labs, and treatment plans  - Assess nutrition and hydration status and recommend course of action  - Evaluate amount of meals eaten  - Assist patient with eating if necessary   - Allow adequate time for meals  - Recommend/ encourage appropriate diets, oral nutritional supplements, and vitamin/mineral supplements  - Order, calculate, and assess calorie counts as needed  - Assess need for intravenous fluids  - Provide specific nutrition/hydration education as appropriate  - Include patient/family/caregiver in decisions related to nutrition   Outcome: Progressing     Problem: METABOLIC, FLUID AND ELECTROLYTES - ADULT  Goal: Electrolytes maintained within normal limits  Description  INTERVENTIONS:  - Monitor labs and assess patient for signs and symptoms of electrolyte imbalances  - Administer electrolyte replacement as ordered  - Monitor response to electrolyte replacements, including repeat lab results as appropriate  - Instruct patient on fluid and nutrition as appropriate  Outcome: Progressing  Goal: Fluid balance maintained  Description  INTERVENTIONS:  - Monitor labs   - Monitor I/O and WT  - Instruct patient on fluid and nutrition as appropriate  - Assess for signs & symptoms of volume excess or deficit  Outcome: Progressing  Goal: Glucose maintained within target range  Description  INTERVENTIONS:  - Monitor Blood Glucose as ordered  - Assess for signs and symptoms of hyperglycemia and hypoglycemia  - Administer ordered medications to maintain glucose within target range  - Assess nutritional intake and initiate nutrition service referral as needed  Outcome: Progressing  Monitoring glucose

## 2019-10-01 NOTE — SOCIAL WORK
Discharge ordered  Pt will be transferred to [facility]  for skilled rehab  Pt's sister, Aracelis Read, called to request transport be arranged for pt  Sister aware of possible charge for transportation  [facility] [employer] contacted to arrange wheelchair [facility]  [facility] scheduled to transport around 8:15 pm   RN Particia Duane), [facility] and pt/sister Laron Hernandez) aware of plan

## 2019-10-01 NOTE — QUICK NOTE
Chart, blood sugars reviewed  Continues to be hyperglycemic throughout the day despite changes in insulin regimen yesterday    Recommend the following for now  -increase Lantus to 25 units subcutaneously at bedtime  -increase Humalog to 10 units subcutaneously with meals 3 times a day  -continue sliding scale insulin  - check 2 am Blood sugar   Will continue to follow and make changes as needed     Ray Toledo MD  Endocrinology

## 2019-10-01 NOTE — ASSESSMENT & PLAN NOTE
Patient altered mental status with confusion and noted to have hyperglycemia with blood sugar of 728 in the ED  Patient's mental status has improved and is close to baseline

## 2019-10-01 NOTE — SOCIAL WORK
LOS - 4 days    SW following to assist with DCP  STR placement was initially planned  Per therapy pt's condition is improving  SW met with pt and sister to review plans  With sister's encouragement pt has chosen to go to short term rehab to regain a bit more strength before returning home  Pt's facility choices yesterday were [facility], [facility], [facility]  and [facility]  SW confirmed with pt her preference  Pt's preference is to remain in [place] at local facilities if possible  Referrals have been made  SW will continue to follow to assist with planning and transfer when ready

## 2019-10-01 NOTE — ASSESSMENT & PLAN NOTE
Lab Results   Component Value Date    HGBA1C 12 3 (H) 09/29/2019       Recent Labs     09/30/19  1647 09/30/19  2158 10/01/19  0738 10/01/19  1104   POCGLU 422* 255* 258* 284*       Blood Sugar Average: Last 72 hrs:  (P) 336 [id number]     Patient had non-anion gap metabolic acidosis likely secondary to hyperglycemia and uncontrolled blood sugars

## 2019-10-02 NOTE — NURSING NOTE
Patient sent to [facility] in stable condition  Informed patient that MRSA screen had come back positive  Explained to patient what this means and how to prevent spread of infection  Pulled out handout with information on MRSA for patient's further reference  Staff at [facility] made aware of patient's positive MRSA screen result

## 2019-10-03 LAB — GAD65 AB SER-ACNC: <5 U/ML (ref 0–5)

## 2019-11-18 NOTE — PROGRESS NOTES
Consultation - Cardiology Office  [facility]  Melchor Watters 69 y o  female MRN: [id number]  : 1945  Unit/Bed#:  Encounter: [id number]      Assessment:     1  3-vessel coronary artery disease    2  Essential hypertension    3  S/P CABG x 3    4  PAD (peripheral artery disease) ([facility] )    5  Dyslipidemia    6  Type 2 diabetes mellitus with hyperglycemia, with long-term current use of insulin ([facility])    7  Hyponatremia    8  Stage 3 chronic kidney disease ([facility] )        Discussion summary and Plan:    1  Three-vessel coronary artery disease with history of CABG and multivessel stenting  Patient has previously followed up with Dr Ann hicks at [facility]   Her last stress test was in 2017 and then she had stent done in 2018  She had some vascular work done in 2018  She had previously PCI of RCA and circumflex  She had a CABG done many years ago  She has some exertional shortness of breath  Will get echo Doppler and Lexiscan stress test     2  Essential hypertension  Blood pressure has been labile  They have been issues of noncompliance in the past or she may be forgetful  For now the blood pressure acceptable with current therapy    3  PVD with history of claudication  She had stenting done in her of her bilateral lower extremity with CO2 angiogram and bilateral common iliac stenting  She really does not have much insight into her medical problem  4  Dyslipidemia  Continue statins    5  Type 2 diabetes mellitus  Very uncontrolled  Blood sugar has been up and down has been admitted multiple times with high blood sugar  As per patient it is really very hard to control her blood sugars  Follow up with endocrinology    6  History of CKD stage 3 and hyponatremia  Nephrology note noted  Last sodium was 134      7  Forgetfulness possible early dementia  8  History of DVT  Patient will blood clot after her vascular procedure has been on Eliquis since then  May need to follow up with vascular  Her records from [facility] reviewed    Plan  For now will continue same cardiac medication  Will try to get some information about her cardiac condition  She will be scheduled for echo Doppler and Lexiscan stress test as her EKG shows anterior lateral ischemia which may be chronic  Blood pressure acceptable advised to control blood sugar  Follow up with others appointment as needed  Thank you for your consultation  If you have any question please call me at [phone]    Counseling :  A description of the counseling  Goals and Barriers  Patient's ability to self care: Yes  Medication side effect reviewed with patient in detail and all their questions answered to their satisfaction  Primary Care Physician Requesting Consult: Joleen Davidson MD    Reason for Consult / Principal Problem:  History of CAD      HPI :     Marcos Mohan is a 69y o  year old female who was referred by herself as she wanted care close to her residence  She has a past medical history significant for essential hypertension, hyperlipidemia, difficult to control type 2 diabetes mellitus, CAD status post CABG many years ago either 3 or 4 bypasses as she does not remember, history of PCI, history of PVD who was admitted to [facility] 09/20/2019 due to elevated blood sugar  She admits her blood sugar has been up and down  Even blood sugar was in the hospital was above to 800  She had a stent put in her coronary arteries all grafts about a year ago at [facility]   When she was admitted she had altered mental status  She came with her sister who brings her to the hospital   As per sister she is getting more forgetful  She still smokes about pack a day in a week  In-hospital she was found to have otitis media and treated for antibiotic    She came for regular follow-up today her heart rate is 77 beats per minute and her EKG shows old anterior septal infarct versus lead location  No nausea no vomiting no fever no chills at this time  She has some peripheral vascular done at [facility] after that she developed clots and she now wants tablets care here  Her records from [facility] reviewed  SUMMARY:  She has CAD s/p CABG, PCI to distal RCA, PCI to mid LCx, PAD, tobacco abuse, [id number], CKD and HL who presents today to establish care  Previously f/b Dr Tyrel Garcia in [place]  Recent stress test in 4/2017 was negative for ischemia  She reports leg pain R quads and calves after 10-15 min of standing  She is s/p 8/17/18 bilateral lower extremity CO2 angiogram with bilateral common iliac stenting (7x79mm VBX stents) with left post-dilate to 10mm  Angiogram also showed bilateral SFA mild stenosis, and it was difficult to see tibial arteries bilaterally  She does not have insight into all her medical problems and we do not have all records at this time  Carotids showed b/l 20-49% stenosis and echo showed normal LVEF and moderate cLVH  Review of Systems   Constitutional: Positive for activity change and fatigue  Negative for chills, diaphoresis, fever and unexpected weight change  HENT: Negative for congestion  Eyes: Negative for discharge and redness  Respiratory: Positive for shortness of breath  Negative for cough, chest tightness and wheezing  Exertional not much change   Cardiovascular: Negative  Negative for chest pain, palpitations and leg swelling  Gastrointestinal: Negative for abdominal pain, diarrhea and nausea  Endocrine: Negative  Blood sugar up and down   Genitourinary: Negative for decreased urine volume and urgency  Musculoskeletal: Negative  Negative for arthralgias, back pain and gait problem  Skin: Negative for rash and wound  Allergic/Immunologic: Negative  Neurological: Negative for dizziness, seizures, syncope, weakness, light-headedness and headaches  Hematological: Negative  Psychiatric/Behavioral: Negative for agitation and confusion  The patient is nervous/anxious           Forgetful and now developing early dementia       Historical Information   Past Medical History:   Diagnosis Date    CAD (coronary artery disease)     Diabetes mellitus ([address] )     Hyperlipidemia     Hypertension     Skin cancer      Past Surgical History:   Procedure Laterality Date    CHOLECYSTECTOMY      CORONARY ARTERY BYPASS GRAFT      SPINAL FUSION      VASCULAR SURGERY Bilateral     Stent in legs     Social History     Substance and Sexual Activity   Alcohol Use Yes    Frequency: Never     Social History     Substance and Sexual Activity   Drug Use Yes    Comment: occ     Social History     Tobacco Use   Smoking Status Current Some Day Smoker    Types: Cigarettes   Smokeless Tobacco Never Used   Tobacco Comment    1-2 cigarettes     Family History:   Family History   Problem Relation Age of Onset    Arthritis Mother     Cancer Mother     Diabetes Father     Stroke Father     Heart disease Father        Meds/Allergies     Allergies   Allergen Reactions    Penicillins Hives     Tolerated ceftriaxone 9/2019        Current Outpatient Medications:     amLODIPine (NORVASC) 5 mg tablet, , Disp: , Rfl:     apixaban (ELIQUIS) 5 mg, Take 1 tablet (5 mg total) by mouth 2 (two) times a day, Disp: , Rfl: 0    Ascorbic Acid (VITAMIN C) 100 MG tablet, Take 500 mg by mouth daily , Disp: , Rfl:     aspirin 81 mg chewable tablet, Chew 81 mg 2 (two) times a day , Disp: , Rfl:     atorvastatin (LIPITOR) 40 mg tablet, Take 40 mg by mouth daily at bedtime, Disp: , Rfl:     carvedilol (COREG) 25 mg tablet, Take 25 mg by mouth 2 (two) times a day with meals , Disp: , Rfl:     cholecalciferol (VITAMIN D3) 1,000 units tablet, Take 1,000 Units by mouth daily , Disp: , Rfl:     cyanocobalamin (VITAMIN B-12) 100 mcg tablet, Take 1,000 mcg by mouth daily , Disp: , Rfl:     doxazosin (CARDURA) 4 mg tablet, Take 4 mg by mouth daily at bedtime, Disp: , Rfl:     furosemide (LASIX) 40 mg tablet, Take 40 mg by mouth daily as needed (edema) , Disp: , Rfl:     gabapentin (NEURONTIN) 100 mg capsule, Take 1 capsule (100 mg total) by mouth 3 (three) times a day (Patient taking differently: Take 300 mg by mouth 2 (two) times a day ), Disp: 90 capsule, Rfl: 0    insulin glargine (LANTUS) 100 units/mL subcutaneous injection, Inject 25 Units under the skin daily at bedtime, Disp: , Rfl: 0    insulin lispro (HumaLOG) 100 units/mL injection, Inject 1-5 Units under the skin daily at bedtime, Disp: , Rfl: 0    insulin lispro (HumaLOG) 100 units/mL injection, Inject 1-6 Units under the skin 3 (three) times a day before meals, Disp: , Rfl: 0    insulin lispro (HumaLOG) 100 units/mL injection, Inject 10 Units under the skin 3 (three) times a day with meals, Disp: , Rfl: 0    ofloxacin (FLOXIN) 0 3 % otic solution, Administer 5 drops to the right ear 2 (two) times a day, Disp: , Rfl:     ranitidine (ZANTAC) 150 mg tablet, Take 150 mg by mouth daily at bedtime as needed for heartburn , Disp: , Rfl:     Vitamin E 400 units TABS, Take 400 Units by mouth daily , Disp: , Rfl:     LORazepam (ATIVAN) 0 5 mg tablet, Take 1 tablet (0 5 mg total) by mouth every 8 (eight) hours as needed for anxiety for up to 10 days, Disp: , Rfl: 0    PARoxetine (PAXIL) 20 mg tablet, , Disp: , Rfl:     Vitals: Blood pressure 124/64, pulse 77, height 5' (1 524 m), weight 62 1 kg (137 lb), SpO2 99 %  Body mass index is 26 76 kg/m²    Vitals:    11/21/19 1630   Weight: 62 1 kg (137 lb)     BP Readings from Last 3 Encounters:   11/21/19 124/64   10/01/19 (!) 179/69   08/02/19 (!) 175/72         Physical Exam    Neurologic:  Alert & oriented x 3, no new focal deficits, Not in any acute distress,  Constitutional:  Well developed, well nourished, non-toxic appearance   Eyes:  Pupil equal and reacting to light, conjunctiva normal, No JVP, No LNP   HENT:  Atraumatic, oropharynx moist, Neck- normal range of motion, no tenderness,  Neck supple   Respiratory:  Bilateral air entry, mostly clear to auscultation  Cardiovascular: S1-S2 regular with a 2/6 ejection systolic murmur and S4 is present  GI:  Soft, nondistended, normal bowel sounds, nontender, no hepatosplenomegaly appreciated  Musculoskeletal:  No edema, no tenderness, no deformities  Skin:  Well hydrated, no rash   Lymphatic:  No lymphadenopathy noted   Extremities:  No edema and decreased pulses    Diagnostic Studies Review Cardio:  ECG 12-LEAD Status: Normal 8/7/2018   Sinus Rhythm   Old anteroseptal infarct  Negative T-waves with Possible Anterolateral ischemia  TTE:  I did review the images myself today  ECHO 2D COMPLETE Status: Normal 9/19/2018 11:49 AM  Narrative ECHO REPORT   Ebony Hull   09/19/2018 11:13     Account Number: [[id number]]   Clinical Indications:   Chronic ischemic heart disease; Coronary artery disease   involving coronary bypass graft of native heart without   angina pectoris   Technical Quality:Good   ICD Codes: I25 9; I25 810   Rhythm:   Technical Notes:   CONCLUSIONS     Normal left ventricular systolic function  Moderate concentric left ventricular hypertrophy  Mild mitral regurgitation  Mild diastolic dysfunction with normal filling pressures     Visually Estimated LV Ejection Fraction is:55%     CARDIAC CHAMBER ASSESSMENT   2D ECHO MEASUREMENTS   LV Systolic Diameter Base LX 2 2 cm   LV Diastolic Diameter Base LX 4 7 cm   LV Ejection Fraction SIM 51 4 %   Fractional Shortening BASE LX 0 5 9 64-4 86   LVPW Diastolic Thickness 1 2 cm 6 8-5 9 cm   IVS Diastolic Thickness 1 4 cm 0 6-1 1 cm   IVS to PW Ratio 1 1   LA AP 3 7 cm   LV Mass 244 0 g   LV Mass Index 144 9 g/m²   LA Volume 56 5 ml   LA Volume Index 33 6 ml/m²   LV Diastolic Volume  2 ml   LV Systolic Volume SIM 67 9 ml 5 0-0 3   LV Diastolic Diameter Index 2 8 cm/m²   TAPSE 2 0 cm   LV Diastolic Length 4C 7 2 cm   LV Stroke Volume SIM 54 0 ml     51   43     LEFT VENTRICLE   Normal left ventricular chamber size  Normal left ventricular systolic   function  Moderate concentric left ventricular hypertrophy  RIGHT VENTRICLE   Normal right ventricular size and function  LEFT ATRIUM   Normal left atrial size  RIGHT ATRIUM   Normal right atrial size  AORTA   Normal aortic root for body surface area  PERICARDIUM   Normal pericardium without effusion  VEINS   Normal size inferior vena cava with normal inspiratory collapse consistent   with normal right atrial pressures (0-5mmhg)  Other Comments     VALVES, DOPPLER AND HEMODYNAMICS   DOPPLER MEASUREMENTS   Heart Rate 60 0 bpm   LVOT Peak Velocity 63 2 cm/s   LVOT Peak Gradient 1 6 mmHg   LVOT Velocity Time Integral 16 4 cm   Mitral E Point Velocity 100 2 cm/s   Mitral E to A Ratio 1 6   MV Deceleration Time 380 3 ms   MV E Prime Velocity Septum 4 7 cm/s   MV E to E Prime Ratio Septal 21 4   MV E Prime Velocity Lateral Wall 6 9 cm/s   MV E to E Prime Ratio Lateral Wa 14 5   MV E Prime Average 5 8 cm/s   E/e' Average 17 3   A Prime Velocity 7 7 cm/s   S Velocity 7 2 cm/s     Color flow, pulse wave, and continuous wave Doppler were performed   and analyzed  AORTIC VALVE   Structurally normal aortic valve without significant stenosis or   regurgitation  MITRAL VALVE   Thickened mitral valve leaflets  Mild mitral regurgitation  TRICUSPID VALVE   Structurally normal tricuspid valve without significant stenosis or   regurgitation  PULMONIC VALVE   Structurally normal pulmonic valve without significant stenosis or   regurgitation        DOPPLER HEMODYNAMICS   Mild diastolic dysfunction with normal filling pressures    The pulmonary   artery pressure could not be calculated due to an incomplete tricuspid   regurgitant gradient measurement       Дмитрий Navarro DO Fellow:   (Electronically Signed)   Final Date:19 September 2018 11:49         Stress Test:  I did review the images myself today  No results found for this or any previous visit  4/25/17 Lexiscan stress: neg ischemia    Cardiac Catheterization:  3/2010  LV-gram 60%, % occluded with multiple stents in prox LAD, 30-35% stenosis of LCx  LIMA-LAD patent , 60-65% stenosis distal to LIMA to LAD anastomosis, 35% stenosis of prox RCA  L ALFREDITO 90%, R ALFREDITO 50%, b/l SFA 35-40%    Holter:    Carotids:  9/19/18  CONCLUSIONS   Mild stenosis in Right Bulb at 20-49%  Mild stenosis in Left Bulb and proximal ICA at 20-49%  EKG:    Twelve lead EKG 11/21/2019 shows normal sinus rhythm his QS in V1 to V3 due to lead location or cannot rule out previous septal infarct  Nonspecific ST changes  There is a T-wave inversion noted in lead 2 3 V5 V6  Certainly cannot rule out ischemia  Imaging:  Chest X-Ray:   No Chest XR results available for this patient  CT-scan of the chest:     No CTA results available for this patient  Lab Review   Lab Results   Component Value Date    WBC 7 94 09/29/2019    HGB 11 5 10/01/2019    HCT 36 4 10/01/2019    MCV 90 09/29/2019    RDW 12 7 09/29/2019     09/29/2019     BMP:  Lab Results   Component Value Date    SODIUM 135 (L) 10/01/2019    K 3 9 10/01/2019     10/01/2019    CO2 22 10/01/2019    BUN 19 10/01/2019    CREATININE 1 52 (H) 10/01/2019    GLUC 265 (H) 10/01/2019    CALCIUM 8 5 10/01/2019    EGFR 34 10/01/2019    MG 2 3 09/28/2019     LFT:  Lab Results   Component Value Date    AST 12 09/28/2019    ALT 26 09/28/2019    ALKPHOS 101 09/28/2019    TP 6 5 09/28/2019    ALB 3 2 (L) 09/28/2019      Lab Results   Component Value Date    [id number] 1 608 09/28/2019     Lab Results   Component Value Date    HGBA1C 12 3 (H) 09/29/2019     Lipid Profile:     Lab Results   Component Value Date    TROPONINI <0 02 09/28/2019         Dr Berta Nogueira MD [facility]      "This note has been constructed using a voice recognition system  Therefore there may be syntax, spelling, and/or grammatical errors   Please call if you have any questions  "

## 2019-11-21 ENCOUNTER — OFFICE VISIT (OUTPATIENT)
Dept: CARDIOLOGY CLINIC | Facility: CLINIC | Age: 74
End: 2019-11-21
Payer: MEDICARE

## 2019-11-21 VITALS
OXYGEN SATURATION: 99 % | HEART RATE: 77 BPM | BODY MASS INDEX: 26.9 KG/M2 | HEIGHT: 60 IN | SYSTOLIC BLOOD PRESSURE: 124 MMHG | WEIGHT: 137 LBS | DIASTOLIC BLOOD PRESSURE: 64 MMHG

## 2019-11-21 DIAGNOSIS — N18.30 STAGE 3 CHRONIC KIDNEY DISEASE (HCC): ICD-10-CM

## 2019-11-21 DIAGNOSIS — E87.1 HYPONATREMIA: ICD-10-CM

## 2019-11-21 DIAGNOSIS — I10 ESSENTIAL HYPERTENSION: ICD-10-CM

## 2019-11-21 DIAGNOSIS — Z95.1 S/P CABG X 3: ICD-10-CM

## 2019-11-21 DIAGNOSIS — E11.65 TYPE 2 DIABETES MELLITUS WITH HYPERGLYCEMIA, WITH LONG-TERM CURRENT USE OF INSULIN (HCC): ICD-10-CM

## 2019-11-21 DIAGNOSIS — I25.10 3-VESSEL CORONARY ARTERY DISEASE: ICD-10-CM

## 2019-11-21 DIAGNOSIS — I73.9 PAD (PERIPHERAL ARTERY DISEASE) (HCC): ICD-10-CM

## 2019-11-21 DIAGNOSIS — Z79.4 TYPE 2 DIABETES MELLITUS WITH HYPERGLYCEMIA, WITH LONG-TERM CURRENT USE OF INSULIN (HCC): ICD-10-CM

## 2019-11-21 DIAGNOSIS — E78.5 DYSLIPIDEMIA: ICD-10-CM

## 2019-11-21 PROCEDURE — 99205 OFFICE O/P NEW HI 60 MIN: CPT | Performed by: INTERNAL MEDICINE

## 2019-11-21 PROCEDURE — 93000 ELECTROCARDIOGRAM COMPLETE: CPT | Performed by: INTERNAL MEDICINE

## 2019-11-21 RX ORDER — AMLODIPINE BESYLATE 5 MG/1
5 TABLET ORAL DAILY
COMMUNITY
Start: 2019-10-11

## 2019-11-21 RX ORDER — PAROXETINE HYDROCHLORIDE 20 MG/1
20 TABLET, FILM COATED ORAL DAILY
COMMUNITY
Start: 2019-10-11

## 2020-01-15 ENCOUNTER — HOSPITAL ENCOUNTER (OUTPATIENT)
Dept: NON INVASIVE DIAGNOSTICS | Facility: HOSPITAL | Age: 75
Discharge: HOME | End: 2020-01-15
Attending: INTERNAL MEDICINE
Payer: MEDICARE

## 2020-01-15 ENCOUNTER — TRANSCRIBE ORDERS (OUTPATIENT)
Dept: ADMINISTRATIVE | Facility: HOSPITAL | Age: 75
End: 2020-01-15

## 2020-01-15 ENCOUNTER — HOSPITAL ENCOUNTER (OUTPATIENT)
Dept: RADIOLOGY | Facility: HOSPITAL | Age: 75
Discharge: HOME | End: 2020-01-15
Attending: INTERNAL MEDICINE
Payer: MEDICARE

## 2020-01-15 ENCOUNTER — TELEPHONE (OUTPATIENT)
Dept: CARDIOLOGY CLINIC | Facility: CLINIC | Age: 75
End: 2020-01-15

## 2020-01-15 DIAGNOSIS — Z95.1 S/P CABG X 3: ICD-10-CM

## 2020-01-15 DIAGNOSIS — I25.10 3-VESSEL CORONARY ARTERY DISEASE: ICD-10-CM

## 2020-01-15 DIAGNOSIS — Z12.31 ENCOUNTER FOR SCREENING MAMMOGRAM FOR MALIGNANT NEOPLASM OF BREAST: Primary | ICD-10-CM

## 2020-01-15 DIAGNOSIS — E11.65 TYPE 2 DIABETES MELLITUS WITH HYPERGLYCEMIA, WITH LONG-TERM CURRENT USE OF INSULIN (HCC): ICD-10-CM

## 2020-01-15 DIAGNOSIS — Z79.4 TYPE 2 DIABETES MELLITUS WITH HYPERGLYCEMIA, WITH LONG-TERM CURRENT USE OF INSULIN (HCC): ICD-10-CM

## 2020-01-15 DIAGNOSIS — I10 ESSENTIAL HYPERTENSION: ICD-10-CM

## 2020-01-15 DIAGNOSIS — I73.9 PAD (PERIPHERAL ARTERY DISEASE) (HCC): ICD-10-CM

## 2020-01-15 PROCEDURE — 93306 TTE W/DOPPLER COMPLETE: CPT

## 2020-01-15 PROCEDURE — 93016 CV STRESS TEST SUPVJ ONLY: CPT | Performed by: INTERNAL MEDICINE

## 2020-01-15 PROCEDURE — 78452 HT MUSCLE IMAGE SPECT MULT: CPT

## 2020-01-15 PROCEDURE — 93017 CV STRESS TEST TRACING ONLY: CPT

## 2020-01-15 PROCEDURE — A9502 TC99M TETROFOSMIN: HCPCS

## 2020-01-15 PROCEDURE — 93306 TTE W/DOPPLER COMPLETE: CPT | Performed by: INTERNAL MEDICINE

## 2020-01-15 PROCEDURE — 93018 CV STRESS TEST I&R ONLY: CPT | Performed by: INTERNAL MEDICINE

## 2020-01-15 PROCEDURE — 78452 HT MUSCLE IMAGE SPECT MULT: CPT | Performed by: INTERNAL MEDICINE

## 2020-01-15 RX ADMIN — REGADENOSON 0.4 MG: 0.08 INJECTION, SOLUTION INTRAVENOUS at 09:17

## 2020-01-15 NOTE — TELEPHONE ENCOUNTER
----- Message from Sancho Bhatia MD sent at 1/15/2020  2:43 PM EST -----  Patient's echo shows normal LV systolic function  No significant valvular disease  Patient can keep an appointment  Please call patient about echo report

## 2020-01-15 NOTE — TELEPHONE ENCOUNTER
Becca Can will share the results of Echo to the patient  Becca Can will be bringing patient to her follow up appt

## 2020-01-16 ENCOUNTER — TELEPHONE (OUTPATIENT)
Dept: CARDIOLOGY CLINIC | Facility: CLINIC | Age: 75
End: 2020-01-16

## 2020-01-16 LAB
CHEST PAIN STATEMENT: NORMAL
MAX DIASTOLIC BP: 84 MMHG
MAX HEART RATE: 83 BPM
MAX PREDICTED HEART RATE: 147 BPM
MAX. SYSTOLIC BP: 199 MMHG
PROTOCOL NAME: NORMAL
REASON FOR TERMINATION: NORMAL
TARGET HR FORMULA: NORMAL
TIME IN EXERCISE PHASE: NORMAL

## 2020-01-16 NOTE — TELEPHONE ENCOUNTER
----- Message from Layne Hashimoto, MD sent at 1/15/2020  6:57 PM EST -----  Pt's Patient's stress test is normal    Patient can keep regular appointment  Please call patient with the result

## 2020-02-02 NOTE — PROGRESS NOTES
Progress note - Cardiology Office   [facility]  Rosa M Kelly 69 y o  female MRN: [id number]  : 1945  Unit/Bed#:  Encounter: [id number]      Assessment:     1  3-vessel coronary artery disease    2  Essential hypertension    3  Stage 3 chronic kidney disease ([facility] [address] )    4  Dyslipidemia    5  PAD (peripheral artery disease) ([facility] )    6  Hyponatremia    7  S/P CABG x 3    8  Tachycardia        Discussion summary and Plan:    1  Three-vessel coronary artery disease with history of CABG and multivessel stenting  Patient has previously followed up with Dr Rocío Ennis then at [facility]   Her last stress test was in 2017 and then she had stent done in 2018  She had some vascular work done in 2018  She had previously PCI of RCA and circumflex  She had a CABG done many years ago  Nuclear stress test shows no ischemia she has a normal LV systolic function  Finding discussed with them continue medical Rx    2  Essential hypertension  Blood pressure has been labile  They have been issues of noncompliance in the past or she may be forgetful  Patient blood pressure is acceptable  She is not compliant with her medications she is very forgetful as per friend  3  PVD with history of claudication  She had stenting done in her of her bilateral lower extremity with CO2 angiogram and bilateral common iliac stenting  She really does not have much insight into her medical problem  Refer her to vascular surgery as she need 5 follow-up    4  Dyslipidemia  Continue statins    5  Type 2 diabetes mellitus  Very uncontrolled  Blood sugar has been up and down has been admitted multiple times with high blood sugar  As per patient it is really very hard to control her blood sugars  Follow up with endocrinology    6  History of CKD stage 3 and hyponatremia  Sodium is now 134    7  Forgetfulness possible early dementia  8  History of DVT    Patient will blood clot after her vascular procedure has been on Eliquis since then  She will follow up with vascular surgery    9  Tachycardia  Patient has abnormal EKG with tachycardia with ST depression multiple leads  Patient nuclear recently done was nonischemic  She need to compliant with her medication she forgot to take carvedilol  Her records from [facility] reviewed    Plan  For now will continue same cardiac medication  Will try to get some information about her cardiac condition  She will be scheduled for echo Doppler and Lexiscan stress test as her EKG shows anterior lateral ischemia which may be chronic  Blood pressure acceptable advised to control blood sugar  Follow up with others appointment as needed  Thank you for your consultation  If you have any question please call me at [phone]    Counseling :  A description of the counseling  Goals and Barriers  Patient's ability to self care: Yes  Medication side effect reviewed with patient in detail and all their questions answered to their satisfaction  Primary Care Physician : Jolie Clark MD      HPI :     Umberto Harris is a 69y o  year old female who was referred by herself as she wanted care close to her residence  She has a past medical history significant for essential hypertension, hyperlipidemia, difficult to control type 2 diabetes mellitus, CAD status post CABG many years ago either 3 or 4 bypasses as she does not remember, history of PCI, history of PVD who was admitted to [facility] 09/20/2019 due to elevated blood sugar  She admits her blood sugar has been up and down  Even blood sugar was in the hospital was above to 800  She had a stent put in her coronary arteries all grafts about a year ago at [facility]   When she was admitted she had altered mental status  She came with her sister who brings her to the hospital   As per sister she is getting more forgetful  She still smokes about pack a day in a week  In-hospital she was found to have otitis media and treated for antibiotic  She came for regular follow-up today her heart rate is 77 beats per minute and her EKG shows old anterior septal infarct versus lead location  No nausea no vomiting no fever no chills at this time  She has some peripheral vascular done at [facility] after that she developed clots and she now wants tablets care here  Her records from [facility] reviewed  SUMMARY:  She has CAD s/p CABG, PCI to distal RCA, PCI to mid LCx, PAD, tobacco abuse, [id number], CKD and HL who presents today to establish care  Previously f/b Dr Gila Ingram in [place]  Recent stress test in 4/2017 was negative for ischemia  She reports leg pain R quads and calves after 10-15 min of standing  She is s/p 8/17/18 bilateral lower extremity CO2 angiogram with bilateral common iliac stenting (7x79mm VBX stents) with left post-dilate to 10mm  Angiogram also showed bilateral SFA mild stenosis, and it was difficult to see tibial arteries bilaterally  She does not have insight into all her medical problems and we do not have all records at this time  Carotids showed b/l 20-49% stenosis and echo showed normal LVEF and moderate cLVH  02/07/2020  Above reviewed  Patient came for follow-up  She is doing well from cardiac point of view  She is taking her antithrombotic therapy very well  Heart rate is slightly elevated  She did not take her Coreg  She had history of PVD now she is mostly complaining about lower extremity pain when she walks  Her cardiac workup has been negative  Echo shows low unruly LV systolic function  She still forgets take some of the medications  She forgot take Coreg this morning  No fever no chills no nausea no other significant complaint  She has complex medical history as mentioned above  Review of Systems   Constitutional: Positive for fatigue   Negative for activity change, chills, diaphoresis, fever and unexpected weight change  HENT: Negative for congestion  Eyes: Negative for discharge and redness  Respiratory: Positive for shortness of breath  Negative for cough, chest tightness and wheezing  Chronic not changed   Cardiovascular: Negative  Negative for chest pain, palpitations and leg swelling  Gastrointestinal: Negative for abdominal pain, diarrhea and nausea  Endocrine: Negative  Blood sugars in up and   Genitourinary: Negative for decreased urine volume and urgency  Musculoskeletal: Positive for arthralgias  Negative for back pain and gait problem  Bilateral leg pain when walks, has history of PVD and stents   Skin: Negative for rash and wound  Allergic/Immunologic: Negative  Neurological: Negative for dizziness, seizures, syncope, weakness, light-headedness and headaches  Hematological: Negative  Psychiatric/Behavioral: Negative for agitation and confusion  The patient is nervous/anxious          Historical Information   Past Medical History:   Diagnosis Date    CAD (coronary artery disease)     Diabetes mellitus ([facility] [address] )     Hyperlipidemia     Hypertension     Skin cancer      Past Surgical History:   Procedure Laterality Date    CHOLECYSTECTOMY      CORONARY ARTERY BYPASS GRAFT      SPINAL FUSION      VASCULAR SURGERY Bilateral     Stent in legs     Social History     Substance and Sexual Activity   Alcohol Use Not Currently    Frequency: Never     Social History     Substance and Sexual Activity   Drug Use Yes    Comment: occ     Social History     Tobacco Use   Smoking Status Current Some Day Smoker    Types: Cigarettes   Smokeless Tobacco Never Used   Tobacco Comment    1-2 cigarettes     Family History:   Family History   Problem Relation Age of Onset    Arthritis Mother     Cancer Mother     Diabetes Father     Stroke Father     Heart disease Father        Meds/Allergies     Allergies   Allergen Reactions    Penicillins Hives     Tolerated ceftriaxone 9/2019        Current Outpatient Medications:     amLODIPine (NORVASC) 5 mg tablet, Take 5 mg by mouth daily , Disp: , Rfl:     apixaban (ELIQUIS) 5 mg, Take 1 tablet (5 mg total) by mouth 2 (two) times a day, Disp: , Rfl: 0    Ascorbic Acid (VITAMIN C) 100 MG tablet, Take 500 mg by mouth daily , Disp: , Rfl:     aspirin 81 mg chewable tablet, Chew 81 mg daily , Disp: , Rfl:     atorvastatin (LIPITOR) 40 mg tablet, Take 40 mg by mouth daily at bedtime, Disp: , Rfl:     carvedilol (COREG) 25 mg tablet, Take 25 mg by mouth 2 (two) times a day with meals , Disp: , Rfl:     cholecalciferol (VITAMIN D3) 1,000 units tablet, Take 1,000 Units by mouth daily , Disp: , Rfl:     cyanocobalamin (VITAMIN B-12) 100 mcg tablet, Take 1,000 mcg by mouth daily , Disp: , Rfl:     doxazosin (CARDURA) 4 mg tablet, Take 4 mg by mouth daily at bedtime, Disp: , Rfl:     gabapentin (NEURONTIN) 100 mg capsule, Take 1 capsule (100 mg total) by mouth 3 (three) times a day (Patient taking differently: Take 300 mg by mouth 2 (two) times a day ), Disp: 90 capsule, Rfl: 0    insulin glargine (LANTUS) 100 units/mL subcutaneous injection, Inject 25 Units under the skin daily at bedtime, Disp: , Rfl: 0    insulin lispro (HumaLOG) 100 units/mL injection, Inject 1-5 Units under the skin daily at bedtime (Patient taking differently: Inject 1-5 Units under the skin 2 (two) times a day ), Disp: , Rfl: 0    LORazepam (ATIVAN) 0 5 mg tablet, Take 1 tablet (0 5 mg total) by mouth every 8 (eight) hours as needed for anxiety for up to 10 days, Disp: , Rfl: 0    PARoxetine (PAXIL) 20 mg tablet, Take 20 mg by mouth daily , Disp: , Rfl:     ranitidine (ZANTAC) 150 mg tablet, Take 150 mg by mouth daily at bedtime as needed for heartburn , Disp: , Rfl:     Vitamin E 400 units TABS, Take 400 Units by mouth daily , Disp: , Rfl:     furosemide (LASIX) 40 mg tablet, Take 40 mg by mouth daily as needed (edema) , Disp: , Rfl:     insulin lispro (HumaLOG) 100 units/mL injection, Inject 1-6 Units under the skin 3 (three) times a day before meals (Patient not taking: Reported on 2/7/2020), Disp: , Rfl: 0    insulin lispro (HumaLOG) 100 units/mL injection, Inject 10 Units under the skin 3 (three) times a day with meals (Patient not taking: Reported on 2/7/2020), Disp: , Rfl: 0    ofloxacin (FLOXIN) 0 3 % otic solution, Administer 5 drops to the right ear 2 (two) times a day, Disp: , Rfl:     Vitals: Blood pressure 138/68, pulse 98, height 5' (1 524 m), weight 59 kg (130 lb), SpO2 98 %  Body mass index is 25 39 kg/m²  Vitals:    02/07/20 1258   Weight: 59 kg (130 lb)     BP Readings from Last 3 Encounters:   02/07/20 138/68   11/21/19 124/64   10/01/19 (!) 179/69         Physical Exam   Constitutional: She is oriented to person, place, and time  She appears well-developed and well-nourished  No distress  HENT:   Head: Normocephalic and atraumatic  Eyes: Pupils are equal, round, and reactive to light  Neck: Neck supple  No JVD present  No tracheal deviation present  No thyromegaly present  Cardiovascular: Normal rate, regular rhythm, S1 normal and S2 normal  Exam reveals no gallop, no S3, no S4, no distant heart sounds and no friction rub  Murmur heard  Systolic (ejection) murmur is present with a grade of 2/6  S1-S2 regular with 2/6 as murmur, S4 present   Pulmonary/Chest: Breath sounds normal  She is in respiratory distress  She has no wheezes  She has no rales  She exhibits no tenderness  Abdominal: Soft  Bowel sounds are normal  She exhibits no distension  There is no tenderness  Musculoskeletal: She exhibits no edema or deformity  Neurological: She is alert and oriented to person, place, and time  Skin: Skin is warm and dry  No rash noted  She is not diaphoretic  No pallor  Psychiatric: She has a normal mood and affect   Her behavior is normal  Judgment normal          Diagnostic Studies Review Cardio:    Nuclear stress test   Nuclear stress test done 01/15/2020 was normal with EF around 50%  There was no significant wall motion abnormality  Echo Doppler  Echo Doppler done in January 2020 shows EF 50-55%, right ventricle mildly dilated and mildly reduced function, left atrium markedly  Dilated no aortic stenosis, mild TR  ECG 12-LEAD Status: Normal 8/7/2018   Sinus Rhythm   Old anteroseptal infarct  Negative T-waves with Possible Anterolateral ischemia  TTE:  I did review the images myself today  ECHO 2D COMPLETE Status: Normal 9/19/2018 11:49 AM  Narrative ECHO REPORT   Trung Menendez   09/19/2018 11:13     Account Number: [[id number]]   Clinical Indications:   Chronic ischemic heart disease; Coronary artery disease   involving coronary bypass graft of native heart without   angina pectoris   Technical Quality:Good   ICD Codes: I25 9; I25 810   Rhythm:   Technical Notes:   CONCLUSIONS     Normal left ventricular systolic function  Moderate concentric left ventricular hypertrophy  Mild mitral regurgitation  Mild diastolic dysfunction with normal filling pressures  Visually Estimated LV Ejection Fraction is:55%     CARDIAC CHAMBER ASSESSMENT   2D ECHO MEASUREMENTS   LV Systolic Diameter Base LX 2 2 cm   LV Diastolic Diameter Base LX 4 7 cm   LV Ejection Fraction SIM 51 4 %   Fractional Shortening BASE LX 0 5 8 91-4 20   LVPW Diastolic Thickness 1 2 cm 1 1-3 3 cm   IVS Diastolic Thickness 1 4 cm 0 6-1 1 cm   IVS to PW Ratio 1 1   LA AP 3 7 cm   LV Mass 244 0 g   LV Mass Index 144 9 g/m²   LA Volume 56 5 ml   LA Volume Index 33 6 ml/m²   LV Diastolic Volume  2 ml   LV Systolic Volume SIM 01 9 ml 7 1-5 6   LV Diastolic Diameter Index 2 8 cm/m²   TAPSE 2 0 cm   LV Diastolic Length 4C 7 2 cm   LV Stroke Volume SIM 54 0 ml     51   43     LEFT VENTRICLE   Normal left ventricular chamber size  Normal left ventricular systolic   function   Moderate concentric left ventricular hypertrophy  RIGHT VENTRICLE   Normal right ventricular size and function  LEFT ATRIUM   Normal left atrial size  RIGHT ATRIUM   Normal right atrial size  AORTA   Normal aortic root for body surface area  PERICARDIUM   Normal pericardium without effusion  VEINS   Normal size inferior vena cava with normal inspiratory collapse consistent   with normal right atrial pressures (0-5mmhg)  Other Comments     VALVES, DOPPLER AND HEMODYNAMICS   DOPPLER MEASUREMENTS   Heart Rate 60 0 bpm   LVOT Peak Velocity 63 2 cm/s   LVOT Peak Gradient 1 6 mmHg   LVOT Velocity Time Integral 16 4 cm   Mitral E Point Velocity 100 2 cm/s   Mitral E to A Ratio 1 6   MV Deceleration Time 380 3 ms   MV E Prime Velocity Septum 4 7 cm/s   MV E to E Prime Ratio Septal 21 4   MV E Prime Velocity Lateral Wall 6 9 cm/s   MV E to E Prime Ratio Lateral Wa 14 5   MV E Prime Average 5 8 cm/s   E/e' Average 17 3   A Prime Velocity 7 7 cm/s   S Velocity 7 2 cm/s     Color flow, pulse wave, and continuous wave Doppler were performed   and analyzed  AORTIC VALVE   Structurally normal aortic valve without significant stenosis or   regurgitation  MITRAL VALVE   Thickened mitral valve leaflets  Mild mitral regurgitation  TRICUSPID VALVE   Structurally normal tricuspid valve without significant stenosis or   regurgitation  PULMONIC VALVE   Structurally normal pulmonic valve without significant stenosis or   regurgitation        DOPPLER HEMODYNAMICS   Mild diastolic dysfunction with normal filling pressures    The pulmonary   artery pressure could not be calculated due to an incomplete tricuspid   regurgitant gradient measurement  Gladys Sosa DO Fellow:   (Electronically Signed)   Final Date:19 September 2018 11:49         Stress Test:  I did review the images myself today  No results found for this or any previous visit    4/25/17 Lexiscan stress: neg ischemia    Cardiac Catheterization:  3/2010  LV-gram 60%, LAD 100% occluded with multiple stents in prox LAD, 30-35% stenosis of LCx  LIMA-LAD patent , 60-65% stenosis distal to LIMA to LAD anastomosis, 35% stenosis of prox RCA  L ALFREDITO 90%, R ALFREDITO 50%, b/l SFA 35-40%    Holter:    Carotids:  9/19/18  CONCLUSIONS   Mild stenosis in Right Bulb at 20-49%  Mild stenosis in Left Bulb and proximal ICA at 20-49%  EKG:    Twelve lead EKG 11/21/2019 shows normal sinus rhythm his QS in V1 to V3 due to lead location or cannot rule out previous septal infarct  Nonspecific ST changes  There is a T-wave inversion noted in lead 2 3 V5 V6  Certainly cannot rule out ischemia  Twelve lead EKG 02/07/2020 shows normal sinus rhythm heart rate 98 beats per minute nonspecific ST changes  ST flattening noted in lead 2 3 V4 to V6  As compared to old EKG no change  Imaging:  Chest X-Ray:   No Chest XR results available for this patient  CT-scan of the chest:     No CTA results available for this patient  Lab Review   Lab Results   Component Value Date    WBC 7 94 09/29/2019    HGB 11 5 10/01/2019    HCT 36 4 10/01/2019    MCV 90 09/29/2019    RDW 12 7 09/29/2019     09/29/2019     BMP:  Lab Results   Component Value Date    SODIUM 135 (L) 10/01/2019    K 3 9 10/01/2019     10/01/2019    CO2 22 10/01/2019    BUN 19 10/01/2019    CREATININE 1 52 (H) 10/01/2019    GLUC 265 (H) 10/01/2019    CALCIUM 8 5 10/01/2019    EGFR 34 10/01/2019    MG 2 3 09/28/2019     LFT:  Lab Results   Component Value Date    AST 12 09/28/2019    ALT 26 09/28/2019    ALKPHOS 101 09/28/2019    TP 6 5 09/28/2019    ALB 3 2 (L) 09/28/2019      Lab Results   Component Value Date    [id number] 1 608 09/28/2019     Lab Results   Component Value Date    HGBA1C 12 3 (H) 09/29/2019     Lipid Profile:     Lab Results   Component Value Date    TROPONINI <0 02 09/28/2019         Dr Cara Oconnell MD [facility]      "This note has been constructed using a voice recognition system  Therefore there may be syntax, spelling, and/or grammatical errors   Please call if you have any questions  "

## 2020-02-07 ENCOUNTER — OFFICE VISIT (OUTPATIENT)
Dept: CARDIOLOGY CLINIC | Facility: CLINIC | Age: 75
End: 2020-02-07
Payer: MEDICARE

## 2020-02-07 VITALS
HEIGHT: 60 IN | SYSTOLIC BLOOD PRESSURE: 138 MMHG | HEART RATE: 98 BPM | DIASTOLIC BLOOD PRESSURE: 68 MMHG | BODY MASS INDEX: 25.52 KG/M2 | WEIGHT: 130 LBS | OXYGEN SATURATION: 98 %

## 2020-02-07 DIAGNOSIS — R00.0 TACHYCARDIA: ICD-10-CM

## 2020-02-07 DIAGNOSIS — E87.1 HYPONATREMIA: ICD-10-CM

## 2020-02-07 DIAGNOSIS — I73.9 PAD (PERIPHERAL ARTERY DISEASE) (HCC): ICD-10-CM

## 2020-02-07 DIAGNOSIS — Z95.1 S/P CABG X 3: ICD-10-CM

## 2020-02-07 DIAGNOSIS — E78.5 DYSLIPIDEMIA: ICD-10-CM

## 2020-02-07 DIAGNOSIS — N18.30 STAGE 3 CHRONIC KIDNEY DISEASE (HCC): ICD-10-CM

## 2020-02-07 DIAGNOSIS — I10 ESSENTIAL HYPERTENSION: ICD-10-CM

## 2020-02-07 DIAGNOSIS — I25.10 3-VESSEL CORONARY ARTERY DISEASE: ICD-10-CM

## 2020-02-07 PROCEDURE — 93000 ELECTROCARDIOGRAM COMPLETE: CPT | Performed by: INTERNAL MEDICINE

## 2020-02-07 PROCEDURE — 99214 OFFICE O/P EST MOD 30 MIN: CPT | Performed by: INTERNAL MEDICINE

## 2020-02-11 ENCOUNTER — HOSPITAL ENCOUNTER (OUTPATIENT)
Dept: RADIOLOGY | Facility: HOSPITAL | Age: 75
Discharge: HOME | End: 2020-02-11
Payer: MEDICARE

## 2020-02-11 DIAGNOSIS — Z12.31 ENCOUNTER FOR SCREENING MAMMOGRAM FOR MALIGNANT NEOPLASM OF BREAST: ICD-10-CM

## 2020-02-11 PROCEDURE — 77067 SCR MAMMO BI INCL CAD: CPT

## 2020-02-14 ENCOUNTER — TELEPHONE (OUTPATIENT)
Dept: RADIOLOGY | Facility: HOSPITAL | Age: 75
End: 2020-02-14

## 2020-02-28 ENCOUNTER — OFFICE VISIT (OUTPATIENT)
Dept: VASCULAR SURGERY | Facility: CLINIC | Age: 75
End: 2020-02-28
Payer: MEDICARE

## 2020-02-28 VITALS — DIASTOLIC BLOOD PRESSURE: 60 MMHG | HEART RATE: 66 BPM | TEMPERATURE: 97.1 F | SYSTOLIC BLOOD PRESSURE: 100 MMHG

## 2020-02-28 DIAGNOSIS — N18.30 STAGE 3 CHRONIC KIDNEY DISEASE (HCC): ICD-10-CM

## 2020-02-28 DIAGNOSIS — I10 ESSENTIAL HYPERTENSION: ICD-10-CM

## 2020-02-28 DIAGNOSIS — E78.5 DYSLIPIDEMIA: ICD-10-CM

## 2020-02-28 DIAGNOSIS — F17.200 SMOKING ADDICTION: ICD-10-CM

## 2020-02-28 DIAGNOSIS — Z79.4 TYPE 2 DIABETES MELLITUS WITH HYPERGLYCEMIA, WITH LONG-TERM CURRENT USE OF INSULIN (HCC): ICD-10-CM

## 2020-02-28 DIAGNOSIS — R09.89 BILATERAL CAROTID BRUITS: Primary | ICD-10-CM

## 2020-02-28 DIAGNOSIS — I74.09 AORTOILIAC OCCLUSIVE DISEASE (HCC): ICD-10-CM

## 2020-02-28 DIAGNOSIS — I73.9 PAD (PERIPHERAL ARTERY DISEASE) (HCC): ICD-10-CM

## 2020-02-28 DIAGNOSIS — E11.65 TYPE 2 DIABETES MELLITUS WITH HYPERGLYCEMIA, WITH LONG-TERM CURRENT USE OF INSULIN (HCC): ICD-10-CM

## 2020-02-28 PROCEDURE — 99214 OFFICE O/P EST MOD 30 MIN: CPT | Performed by: PHYSICIAN ASSISTANT

## 2020-02-28 NOTE — LETTER
February 29, 2020     Jazmin Godinez [address]  [address]    Patient: Julian Ramesh   YOB: 1945   Date of Visit: 2/28/2020     Dear Dr Elizabeth Frias      Thank you for referring Judi Burrell to me for evaluation  Below are the relevant portions of my assessment and plan of care  If you have questions, please do not hesitate to call me  I look forward to following Kane Jon along with you  Sincerely,        Dianne Ewing PA-C        CC: Soni Holley MD    Progress Notes:      Assessment/Plan: Aortoiliac occlusive disease   Bilateral iliac stenting (LVH)   HX right ALFREDITO stent thrombosis requiring thrombolysis June 2019   Peripheral arterial disease   coronary artery disease status post coronary artery bypass grafting   Diabetes mellitus requiring insulin -  Uncontrolled   Chronic kidney disease stage 3   Hypertension   Dyslipidemia   Bilateral carotid bruits   Smoking addiction  ? Medical noncompliance    68 y/o F with multiple active medical problems who is referred for evaluation of aorto-iliac occlusive disease and peripheral arterial disease  She was previously followed at [facility] by Dr Rajani Buenrostro for vascular disease  She underwent a CO2 angiogram 08/17/2018 with bilateral ALFREDITO VBX stents  As per [facility] notes, the a-gram study showed bilateral mild SFA disease and tibials were difficult to assess  In June 2019, she presented to [address] [place] [address] unable to walk  She was found to have a thrombosis of the R ALFREDITO stent which required thrombolysis followed by angioplasty of the bilateral common iliac artery stents and intervention to the right external iliac artery with angioplasty and stenting  The hospitalization was complicated by uncontrolled hypertension, acute kidney injury and uncontrolled diabetes mellitus  Her family is unable to get her to [facility] so she is transferring cardiac and vascular care to [facility]       The patient comes into the office today accompanied by her sister  She has an abnormal, slow and ?waddling gait  She is not active but denies symptoms of abdominal pain or overt claudication  She has no rest pain  Sister tells me that Tiffany Roblero lives alone and she is unreliable to take medications  We reviewed her recent testing which shows diabetes mellitus is seriously uncontrolled with hemoglobin A1c 13 5  Tiffany Roblero did not seem to know her diabetic regimen  She has no recent non-invasive vascular studies  We reviewed her records from [facility]   We reviewed her most recent testing and medications  She remains on aspirin, Eliquis 5 b i d  (? R ALFREDITO thrombosis) and high intensity statin therapy  Review labs 02/20/2020:  Hemoglobin A1c 13 5, estimated average glucose 341  Total cholesterol 175 triglycerides 158 HDL 56 LDL 87    Plan: We will check aortoiliac duplex and lower extremity arterial duplex for baseline studies and bring her back to review  She has no rest pain or wounds but symptoms are hard to assess as she has significant gait abnormality and she is an unreliable historian  We discussed the importance of regular walking as much as she can safely tolerate with underlying gait abnormality  She should follow up with primary care or endocrinology for diabetes mellitus management, A1c is 13 5  We also discussed smoking cessation    I offered to refer her to smoking cessation but she is not ready to quit     -Continue with medical therapy on aspirin, Eliquis and atorvastatin 40  -Check aortoiliac duplex and lower extremity arterial duplex  -Follow up office visit after testing to review  -Call or return sooner for increased pain in your legs; constant numbness, tingling or coldness in the legs; or if you develop wounds on your toes/feet that do not heal

## 2020-02-28 NOTE — PROGRESS NOTES
Assessment/Plan: Aortoiliac occlusive disease   Bilateral iliac stenting (LVH)   HX right ALFREDITO stent thrombosis requiring thrombolysis June 2019   Peripheral arterial disease   coronary artery disease status post coronary artery bypass grafting   Diabetes mellitus requiring insulin -  Uncontrolled   Chronic kidney disease stage 3   Hypertension   Dyslipidemia   Bilateral carotid bruits   Smoking addiction  ? Medical noncompliance    70 y/o F with multiple active medical problems who is referred for evaluation of aorto-iliac occlusive disease and peripheral arterial disease  She was previously followed at [facility] by Dr Mamta Moise for vascular disease  She underwent a CO2 angiogram 08/17/2018 with bilateral ALFREDITO VBX stents  As per [facility] notes, the a-gram study showed bilateral mild SFA disease and tibials were difficult to assess  In June 2019, she presented to [address] [place] [address] unable to walk  She was found to have a thrombosis of the R ALFREDITO stent which required thrombolysis followed by angioplasty of the bilateral common iliac artery stents and intervention to the right external iliac artery with angioplasty and stenting  The hospitalization was complicated by uncontrolled hypertension, acute kidney injury and uncontrolled diabetes mellitus  Her family is unable to get her to [facility] so she is transferring cardiac and vascular care to [facility]  The patient comes into the office today accompanied by her sister  She has an abnormal, slow and ?waddling gait  She is not active but denies symptoms of abdominal pain or overt claudication  She has no rest pain  Sister tells me that Tarsha lives alone and she is unreliable to take medications  We reviewed her recent testing which shows diabetes mellitus is seriously uncontrolled with hemoglobin A1c 13 5  Tarsha did not seem to know her diabetic regimen  She has no recent non-invasive vascular studies       We reviewed her records from [facility]   We reviewed her most recent testing and medications  She remains on aspirin, Eliquis 5 b i d  (? R ALFREDITO thrombosis) and high intensity statin therapy  Review labs 02/20/2020:  Hemoglobin A1c 13 5, estimated average glucose 341  Total cholesterol 175 triglycerides 158 HDL 56 LDL 87    Plan: We will check aortoiliac duplex and lower extremity arterial duplex for baseline studies and bring her back to review  She has no rest pain or wounds but symptoms are hard to assess as she has significant gait abnormality and she is an unreliable historian  We discussed the importance of regular walking as much as she can safely tolerate with underlying gait abnormality  She should follow up with primary care or endocrinology for diabetes mellitus management, A1c is 13 5  We also discussed smoking cessation  I offered to refer her to smoking cessation but she is not ready to quit     -Continue with medical therapy on aspirin, Eliquis and atorvastatin 40  -Check aortoiliac duplex and lower extremity arterial duplex  -Follow up office visit after testing to review  -Call or return sooner for increased pain in your legs; constant numbness, tingling or coldness in the legs; or if you develop wounds on your toes/feet that do not heal          Subjective:      Patient ID: Obi Perdomo is a 69 y o  female  New patient, referred by cardiology for PAD  Patient is quite sedentary at baseline  She does have h/o B ALFREDITO stenting done at [facility]  Patient has not had any recent vascular testing done  Caveats:  Patient is forgetful, she likely has some underlying dementia  She is accompanied by her sister, Chase Ratliff, in the office today who answers most questions  HPI    Obi Perdomo 68 y/o F is referred for aorto-iliac occlusive disease and peripheral arterial disease   The patient is treated for coronary artery disease s/p CAB x3, coronary stenting, uncontrolled diabetes mellitus with neuropathy, hypertension, hyperlipidemia, peripheral arterial disease, active smoking, history of hyponatremia and "forgetfulness" likely some dementia  She was previously followed at [facility] by Dr John Castillo for vascular disease  She underwent a CO2 angiogram 08/17/2018 with bilateral ALFREDITO VBX stents  As per [facility] notes, the a-gram study showed bilateral mild SFA disease and tibials were difficult to assess  In June 2019, she presented to [address] [place] [address] unable to walk  She was found to have a thrombosis of the R ALFREDITO stent which required thrombolysis followed by angioplasty of the bilateral common iliac artery stents and intervention to the right external iliac artery with angioplasty and stenting  The hospitalization was complicated by uncontrolled hypertension, acute kidney injury and uncontrolled diabetes mellitus  Her family is unable to get her to [facility] so she is transferring cardiac and vascular care to [facility] [place]  Patient comes in and appears to have an abnormal, slow and ?waddling gait  She is not very active, but the patient and her sister report that she is walking and completing basic activities comfortably  She has some calf pain at times  She denies buttocks or thigh claudication  She has no rest pain  No abdominal pain  No falls  Sister tells me that Janet Anthony lives alone and she is unreliable to take medications  We reviewed her recent testing which shows diabetes mellitus is seriously uncontrolled with hemoglobin A1c 13 5  Janet Anthony did not seem to know her diabetic regimen  She continues to smoke cigarettes  I offered to refer her to smoking cessation, but she refused  We reviewed her records from [facility]   We reviewed also her most recent testing and medications  She remains on aspirin, Eliquis 5 b i d  (? R ALFREDITO thrombosis) and high intensity statin therapy  Review labs 02/20/2020:  Hemoglobin A1c 13 5, estimated average glucose 341    Total cholesterol 175 triglycerides 158 HDL 56 LDL 87    -no definite claudication at her level of activity  -diabetic neuropathy   -good, optimal medical therapy on aspirin, apixaban, high intensity statin  -?taking medications; ?dementia      The following portions of the patient's history were reviewed and updated as appropriate: allergies, current medications, past family history, past medical history, past social history, past surgical history and problem list     Review of Systems   Constitutional: Negative  Eyes: Negative  Respiratory: Positive for cough, shortness of breath and stridor  Cardiovascular: Negative  Gastrointestinal: Negative  Endocrine: Negative  Genitourinary: Negative  Musculoskeletal: Positive for gait problem  Leg pain   Skin: Negative  Allergic/Immunologic: Negative  Neurological: Positive for weakness  Hematological: Negative  Psychiatric/Behavioral: Positive for confusion and decreased concentration  Objective:    /60 (BP Location: Left arm, Patient Position: Sitting)   Pulse 66   Temp (!) 97 1 °F (36 2 °C) (Tympanic)     Abnormal gait  Bilateral carotid artery bruits  Slow to respond      Physical Exam   Constitutional: She is oriented to person, place, and time  She appears well-developed and well-nourished  She is cooperative  HENT:   Head: Normocephalic and atraumatic  Eyes: Pupils are equal, round, and reactive to light  EOM are normal    Neck: Trachea normal  Neck supple  No JVD present  No thyromegaly present  Cardiovascular: Normal rate, regular rhythm, S1 normal, S2 normal and normal heart sounds  Exam reveals no gallop and no friction rub  No murmur heard  Pulses:       Carotid pulses are 2+ on the right side with bruit, and 2+ on the left side with bruit  Radial pulses are 2+ on the right side, and 2+ on the left side  Femoral pulses are 2+ on the right side, and 0 on the left side  Dorsalis pedis pulses are 0 on the right side, and 0 on the left side          Posterior tibial pulses are 0 on the right side, and 0 on the left side  Bilateral carotid bruits      There are no pulses in the feet  The feet are a bit cool, but pink with good capillary refill  Right leg PT signal present  Difficult to Doppler DP signal     Left leg good AT, DP, PT signals       Pulmonary/Chest: Effort normal and breath sounds normal  No accessory muscle usage  No respiratory distress  She has no wheezes  She has no rales  Abdominal: Soft  Bowel sounds are normal  She exhibits no distension  There is no hepatosplenomegaly  There is no tenderness  Musculoskeletal: Normal range of motion  She exhibits no edema or deformity  Neurological: She is alert and oriented to person, place, and time  Grossly normal    Skin: Skin is warm and dry  No lesion and no rash noted  No cyanosis  Nails show no clubbing  Psychiatric: She has a normal mood and affect  Nursing note and vitals reviewed  I have reviewed and made appropriate changes to the review of systems input by the medical assistant      Vitals:    02/28/20 0905   BP: 100/60   BP Location: Left arm   Patient Position: Sitting   Pulse: 66   Temp: (!) 97 1 °F (36 2 °C)   TempSrc: Tympanic       Patient Active Problem List   Diagnosis    Diabetic polyneuropathy associated with type 2 diabetes mellitus (HCC)    Corns    Pain in both feet    Peripheral arteriosclerosis (HCC)    Radiculopathy of lumbar region    Onychomycosis    MATTI (acute kidney injury) ([address] )    Generalized weakness    Right otitis media    Essential hypertension    Hyponatremia    [facility]    Type 2 diabetes mellitus with hyperglycemia, with long-term current use of insulin (HCC)    Metabolic encephalopathy    3-vessel coronary artery disease    S/P CABG x 3    Dyslipidemia    Stage 3 chronic kidney disease (HCC)    Tachycardia       Past Surgical History:   Procedure Laterality Date    CHOLECYSTECTOMY      CORONARY ARTERY BYPASS GRAFT      SPINAL FUSION      VASCULAR SURGERY Bilateral     Stent in legs       Family History   Problem Relation Age of Onset    Arthritis Mother     Cancer Mother     Diabetes Father     Stroke Father     Heart disease Father     Breast cancer Sister        Social History     Socioeconomic History    Marital status:       Spouse name: Not on file    Number of children: Not on file    Years of education: Not on file    Highest education level: Not on file   Occupational History    Not on file   Social Needs    Financial resource strain: Not on file    Food insecurity:     Worry: Not on file     Inability: Not on file    Transportation needs:     Medical: Not on file     Non-medical: Not on file   Tobacco Use    Smoking status: Current Some Day Smoker     Packs/day: 0 25     Types: Cigarettes    Smokeless tobacco: Never Used   Substance and Sexual Activity    Alcohol use: Not Currently     Frequency: Never    Drug use: Yes     Comment: occ    Sexual activity: Not on file   Lifestyle    Physical activity:     Days per week: Not on file     Minutes per session: Not on file    Stress: Not on file   Relationships    Social connections:     Talks on phone: Not on file     Gets together: Not on file     Attends Advent service: Not on file     Active member of club or organization: Not on file     Attends meetings of clubs or organizations: Not on file     Relationship status: Not on file    Intimate partner violence:     Fear of current or ex partner: Not on file     Emotionally abused: Not on file     Physically abused: Not on file     Forced sexual activity: Not on file   Other Topics Concern    Not on file   Social History Narrative    Not on file       Allergies   Allergen Reactions    Penicillins Hives     Tolerated ceftriaxone 9/2019          Current Outpatient Medications:     amLODIPine (NORVASC) 5 mg tablet, Take 5 mg by mouth daily , Disp: , Rfl:     apixaban (ELIQUIS) 5 mg, Take 1 tablet (5 mg total) by mouth 2 (two) times a day, Disp: , Rfl: 0    Ascorbic Acid (VITAMIN C) 100 MG tablet, Take 500 mg by mouth daily , Disp: , Rfl:     aspirin 81 mg chewable tablet, Chew 81 mg daily , Disp: , Rfl:     atorvastatin (LIPITOR) 40 mg tablet, Take 40 mg by mouth daily at bedtime, Disp: , Rfl:     carvedilol (COREG) 25 mg tablet, Take 25 mg by mouth 2 (two) times a day with meals , Disp: , Rfl:     cholecalciferol (VITAMIN D3) 1,000 units tablet, Take 1,000 Units by mouth daily , Disp: , Rfl:     cyanocobalamin (VITAMIN B-12) 100 mcg tablet, Take 1,000 mcg by mouth daily , Disp: , Rfl:     doxazosin (CARDURA) 4 mg tablet, Take 4 mg by mouth daily at bedtime, Disp: , Rfl:     furosemide (LASIX) 40 mg tablet, Take 40 mg by mouth daily as needed (edema) , Disp: , Rfl:     gabapentin (NEURONTIN) 100 mg capsule, Take 1 capsule (100 mg total) by mouth 3 (three) times a day (Patient taking differently: Take 300 mg by mouth 2 (two) times a day ), Disp: 90 capsule, Rfl: 0    insulin glargine (LANTUS) 100 units/mL subcutaneous injection, Inject 25 Units under the skin daily at bedtime, Disp: , Rfl: 0    insulin lispro (HumaLOG) 100 units/mL injection, Inject 1-5 Units under the skin daily at bedtime (Patient taking differently: Inject 1-5 Units under the skin 2 (two) times a day ), Disp: , Rfl: 0    insulin lispro (HumaLOG) 100 units/mL injection, Inject 1-6 Units under the skin 3 (three) times a day before meals, Disp: , Rfl: 0    ofloxacin (FLOXIN) 0 3 % otic solution, Administer 5 drops to the right ear 2 (two) times a day, Disp: , Rfl:     PARoxetine (PAXIL) 20 mg tablet, Take 20 mg by mouth daily , Disp: , Rfl:     ranitidine (ZANTAC) 150 mg tablet, Take 150 mg by mouth daily at bedtime as needed for heartburn , Disp: , Rfl:     Vitamin E 400 units TABS, Take 400 Units by mouth daily , Disp: , Rfl:

## 2020-03-04 ENCOUNTER — TELEPHONE (OUTPATIENT)
Dept: RADIOLOGY | Facility: HOSPITAL | Age: 75
End: 2020-03-04

## 2020-03-04 ENCOUNTER — HOSPITAL ENCOUNTER (OUTPATIENT)
Dept: RADIOLOGY | Facility: HOSPITAL | Age: 75
Discharge: HOME | End: 2020-03-04
Payer: MEDICARE

## 2020-03-04 DIAGNOSIS — R92.8 ABNORMAL SCREENING MAMMOGRAM: ICD-10-CM

## 2020-03-04 PROCEDURE — 77065 DX MAMMO INCL CAD UNI: CPT

## 2020-03-04 NOTE — TELEPHONE ENCOUNTER
Met with patient,sister and Dr Kirby regarding  recommendation for;      ___x__ RIGHT ______LEFT      _____Ultrasound guided  ____x__Stereotactic  Breast biopsy  ___x__Verbalized understanding  Blood thinners:  __x___yes _____no    Date stopped: ___message to vascular re: hold or not________    Biopsy teaching sheet given:  __x_____yes ______no  I will recall sister after MD sends order for biopsy

## 2020-03-06 ENCOUNTER — TELEPHONE (OUTPATIENT)
Dept: RADIOLOGY | Facility: HOSPITAL | Age: 75
End: 2020-03-06

## 2020-03-09 ENCOUNTER — HOSPITAL ENCOUNTER (OUTPATIENT)
Dept: RADIOLOGY | Facility: HOSPITAL | Age: 75
Discharge: HOME | End: 2020-03-09
Payer: MEDICARE

## 2020-03-09 DIAGNOSIS — I74.09 AORTOILIAC OCCLUSIVE DISEASE (HCC): ICD-10-CM

## 2020-03-09 DIAGNOSIS — E11.65 TYPE 2 DIABETES MELLITUS WITH HYPERGLYCEMIA, WITH LONG-TERM CURRENT USE OF INSULIN (HCC): ICD-10-CM

## 2020-03-09 DIAGNOSIS — I73.9 PAD (PERIPHERAL ARTERY DISEASE) (HCC): ICD-10-CM

## 2020-03-09 DIAGNOSIS — E78.5 DYSLIPIDEMIA: ICD-10-CM

## 2020-03-09 DIAGNOSIS — F17.200 SMOKING ADDICTION: ICD-10-CM

## 2020-03-09 DIAGNOSIS — Z79.4 TYPE 2 DIABETES MELLITUS WITH HYPERGLYCEMIA, WITH LONG-TERM CURRENT USE OF INSULIN (HCC): ICD-10-CM

## 2020-03-09 DIAGNOSIS — R09.89 BILATERAL CAROTID BRUITS: ICD-10-CM

## 2020-03-09 DIAGNOSIS — I10 ESSENTIAL HYPERTENSION: ICD-10-CM

## 2020-03-09 DIAGNOSIS — N18.30 STAGE 3 CHRONIC KIDNEY DISEASE (HCC): ICD-10-CM

## 2020-03-09 PROCEDURE — 93923 UPR/LXTR ART STDY 3+ LVLS: CPT

## 2020-03-09 PROCEDURE — 93880 EXTRACRANIAL BILAT STUDY: CPT

## 2020-03-09 PROCEDURE — 93925 LOWER EXTREMITY STUDY: CPT

## 2020-03-10 PROCEDURE — 93925 LOWER EXTREMITY STUDY: CPT | Performed by: SURGERY

## 2020-03-10 PROCEDURE — 93880 EXTRACRANIAL BILAT STUDY: CPT | Performed by: SURGERY

## 2020-03-10 PROCEDURE — 93922 UPR/L XTREMITY ART 2 LEVELS: CPT | Performed by: SURGERY

## 2020-03-11 ENCOUNTER — HOSPITAL ENCOUNTER (OUTPATIENT)
Dept: RADIOLOGY | Facility: HOSPITAL | Age: 75
Discharge: HOME | End: 2020-03-11
Payer: MEDICARE

## 2020-03-11 DIAGNOSIS — Z79.4 TYPE 2 DIABETES MELLITUS WITH HYPERGLYCEMIA, WITH LONG-TERM CURRENT USE OF INSULIN (HCC): ICD-10-CM

## 2020-03-11 DIAGNOSIS — I74.09 AORTOILIAC OCCLUSIVE DISEASE (HCC): ICD-10-CM

## 2020-03-11 DIAGNOSIS — I10 ESSENTIAL HYPERTENSION: ICD-10-CM

## 2020-03-11 DIAGNOSIS — F17.200 SMOKING ADDICTION: ICD-10-CM

## 2020-03-11 DIAGNOSIS — R09.89 BILATERAL CAROTID BRUITS: ICD-10-CM

## 2020-03-11 DIAGNOSIS — E78.5 DYSLIPIDEMIA: ICD-10-CM

## 2020-03-11 DIAGNOSIS — E11.65 TYPE 2 DIABETES MELLITUS WITH HYPERGLYCEMIA, WITH LONG-TERM CURRENT USE OF INSULIN (HCC): ICD-10-CM

## 2020-03-11 DIAGNOSIS — N18.30 STAGE 3 CHRONIC KIDNEY DISEASE (HCC): ICD-10-CM

## 2020-03-11 DIAGNOSIS — I73.9 PAD (PERIPHERAL ARTERY DISEASE) (HCC): ICD-10-CM

## 2020-03-11 PROCEDURE — 93978 VASCULAR STUDY: CPT

## 2020-03-12 PROCEDURE — 93978 VASCULAR STUDY: CPT | Performed by: SURGERY

## 2020-03-13 ENCOUNTER — OFFICE VISIT (OUTPATIENT)
Dept: VASCULAR SURGERY | Facility: CLINIC | Age: 75
End: 2020-03-13
Payer: MEDICARE

## 2020-03-13 VITALS
RESPIRATION RATE: 16 BRPM | SYSTOLIC BLOOD PRESSURE: 152 MMHG | WEIGHT: 134 LBS | BODY MASS INDEX: 26.31 KG/M2 | TEMPERATURE: 97 F | HEIGHT: 60 IN | HEART RATE: 80 BPM | DIASTOLIC BLOOD PRESSURE: 80 MMHG

## 2020-03-13 DIAGNOSIS — I73.9 PAD (PERIPHERAL ARTERY DISEASE) (HCC): ICD-10-CM

## 2020-03-13 DIAGNOSIS — Z79.4 TYPE 2 DIABETES MELLITUS WITH HYPERGLYCEMIA, WITH LONG-TERM CURRENT USE OF INSULIN (HCC): ICD-10-CM

## 2020-03-13 DIAGNOSIS — E11.65 TYPE 2 DIABETES MELLITUS WITH HYPERGLYCEMIA, WITH LONG-TERM CURRENT USE OF INSULIN (HCC): ICD-10-CM

## 2020-03-13 DIAGNOSIS — I74.09 AORTOILIAC OCCLUSIVE DISEASE (HCC): Primary | ICD-10-CM

## 2020-03-13 DIAGNOSIS — I10 ESSENTIAL HYPERTENSION: ICD-10-CM

## 2020-03-13 DIAGNOSIS — F17.200 SMOKING ADDICTION: ICD-10-CM

## 2020-03-13 DIAGNOSIS — R09.89 BILATERAL CAROTID BRUITS: ICD-10-CM

## 2020-03-13 DIAGNOSIS — N18.30 STAGE 3 CHRONIC KIDNEY DISEASE (HCC): ICD-10-CM

## 2020-03-13 DIAGNOSIS — I70.209 PERIPHERAL ARTERIOSCLEROSIS (HCC): ICD-10-CM

## 2020-03-13 PROCEDURE — 99214 OFFICE O/P EST MOD 30 MIN: CPT | Performed by: PHYSICIAN ASSISTANT

## 2020-03-13 NOTE — LETTER
March 13, 2020     [address]  [address]  [address] [place] [address]    Patient: Justin Rhodes   YOB: 1945   Date of Visit: 3/13/2020     Dear Dr Tom Goode      Thank you for referring Katie Maldonado to me for evaluation  Below are the relevant portions of my assessment and plan of care  If you have questions, please do not hesitate to call me  I look forward to following Violetta Cheng along with you  Sincerely,        Adarsh Shaver PA-C        CC: No Recipients    Progress Notes:       Assessment/Plan: Aortoiliac occlusive disease   Bilateral iliac stenting (LVH)   HX right ALFREDITO stent thrombosis requiring thrombolysis June 2019   Peripheral arterial disease   Chronic anticoagulation    coronary artery disease status post coronary artery bypass grafting   Diabetes mellitus requiring insulin -  Uncontrolled   Chronic kidney disease stage 3   Hypertension   Dyslipidemia   Bilateral carotid bruits   Smoking addiction  ? Medical noncompliance      70 y/o F complex history with many active medical problems who is referred for evaluation of aorto-iliac occlusive disease and peripheral arterial disease  The patient complains of Bilateral leg pain after about 20 minutes of walking (such as around the grocery store ) She is not active  She has no abdominal pain  No rest pain or wounds  She was previously followed at [address] [place] [address] by Dr Agnieszka Velez for vascular disease  She has a history of bilateral ALFREDITO VBX stents ('18) which thrombosed and required lysis in June '19  She is maintained on Eliquis and aspirin for vascular disease  She is transferring cardiac and vascular care to [facility]  We reviewed her vascular testing which shows significant aortoiliac and infrailiac artery stenosis as we suspected  However, overall she seems to function adequately at her level of activity with some underlying dementia  She tells me that she thinks she is doing well for 69years old   She does not seem to have an interest in quitting smoking or working on her diabetes  AOIL 3/11/20  Prox aorta > 75% stenosis  SMA > 75% stenosis  DIANE 3/9/20  R  0 77/69/57; diffuse arterial occlusive disease v HG stenosis or occlusion of the prox SFA, 50-75% prox PF  L 1 18/120/99; diffuse arterial occlusive disease; 50-75% prox SFA; > 75% PF stenosis    Discussion:  I suggested that she work on regular, progressive walking program and better diabetes management  She should also quit smoking  The feet are not threatened and she has no rest pain  We will continue with optimally tolerated medical therapy and periodic clinical monitoring      -Regular walking and exercise program; you should walk for 10 minutes continuously for 1 week and then increase 1 minutes weekly  -We discussed smoking cessation but she is not ready to quit    -Continue with optimal medical therapy on aspirin and statin  -She is also on lifelong anticoagulation as long as she can tolerated due to history of iliac stent thrombosis and PAD  -Check feet daily for wounds  -Work on diabetes mellitus control and good diabetic diet  -Follow-up a while and LE in 6 months and office visit with Dr Toyin Mcarthur  We discussed reasons to be seen sooner  Plan for breast bx   Val Pena will be going for a stereotactic breast biopsy next week  Given her history of acute limb ischemia and iliac occlusions, she would be higher risk for holding anticoagulation  She may hold aspirin for the procedure  The risk / benefit of any holding anticoagulation should be made by the physician performing the procedure  However, if possible, we should try to avoid interruption of anticoagulation  I spoke with Radiology at [address] [place] [address]        Carotid artery disease  -asymptomatic  -< 50% bilateral MEET (R 101/26, L 108/26)  -continue with OMT on asa, Elqiuis and statin  -TLC were discussed  -We discussed sx of stroke for which she should call 911   -Follow up duplex in 2 years

## 2020-03-13 NOTE — PROGRESS NOTES
Assessment/Plan: Aortoiliac occlusive disease   Bilateral iliac stenting ([facility])   HX right ALFREDITO stent thrombosis requiring thrombolysis June 2019   Peripheral arterial disease   Chronic anticoagulation    coronary artery disease status post coronary artery bypass grafting   Diabetes mellitus requiring insulin -  Uncontrolled   Chronic kidney disease stage 3   Hypertension   Dyslipidemia   Bilateral carotid bruits   Smoking addiction  ? Medical noncompliance      70 y/o F complex history with many active medical problems who is referred for evaluation of aorto-iliac occlusive disease and peripheral arterial disease  The patient complains of Bilateral leg pain after about 20 minutes of walking (such as around the grocery store ) She is not active  She has no abdominal pain  No rest pain or wounds  She was previously followed at [facility] by Dr Fito Jenkins for vascular disease  She has a history of bilateral ALFREDITO VBX stents ('18) which thrombosed and required lysis in June '19  She is maintained on Eliquis and aspirin for vascular disease  She is transferring cardiac and vascular care to [facility]  We reviewed her vascular testing which shows significant aortoiliac and infrailiac artery stenosis as we suspected  However, overall she seems to function adequately at her level of activity with some underlying dementia  She tells me that she thinks she is doing well for 69years old  She does not seem to have an interest in quitting smoking or working on her diabetes  AOIL 3/11/20  Prox aorta > 75% stenosis  SMA > 75% stenosis  DIANE 3/9/20  R  0 77/69/57; diffuse arterial occlusive disease v HG stenosis or occlusion of the prox SFA, 50-75% prox PF  L 1 18/120/99; diffuse arterial occlusive disease; 50-75% prox SFA; > 75% PF stenosis    Discussion:  I suggested that she work on regular, progressive walking program and better diabetes management  She should also quit smoking   The feet are not threatened and she has no rest pain  We will continue with optimally tolerated medical therapy and periodic clinical monitoring      -Regular walking and exercise program; you should walk for 10 minutes continuously for 1 week and then increase 1 minutes weekly  -We discussed smoking cessation but she is not ready to quit    -Continue with optimal medical therapy on aspirin and statin  -She is also on lifelong anticoagulation as long as she can tolerated due to history of iliac stent thrombosis and PAD  -Check feet daily for wounds  -Work on diabetes mellitus control and good diabetic diet  -Follow-up a while and LE in 6 months and office visit with Dr Mandy Johnson  We discussed reasons to be seen sooner  Plan for breast bx   Johan Portillo will be going for a stereotactic breast biopsy next week  Given her history of acute limb ischemia and iliac occlusions, she would be higher risk for holding anticoagulation  She may hold aspirin for the procedure  The risk / benefit of any holding anticoagulation should be made by the physician performing the procedure  However, if possible, we should try to avoid interruption of anticoagulation  I spoke with Radiology at [address] [place] [address]  Carotid artery disease  -asymptomatic  -< 50% bilateral MEET (R 101/26, L 108/26)  -continue with OMT on asa, Elqiuis and statin  -TLC were discussed  -We discussed sx of stroke for which she should call 911   -Follow up duplex in 2 years       Subjective:      Patient ID: Mairusz Sousa is a 69 y o  female  Pt is here to review her CV and DIANE of 3/9/2020 and her AOIL of 3/11/2020  Pt denies any Hx or symptoms of CVA or TIA  Pt denies any abdominal, back or flank pain  Pt denies any nausea, vomiting or bloating after eating  She denies any leg pain with walking  Pt denies any numbness, tingling or night time pain in her legs  Pt was last seen on 2/28/2020 for Bilateral Carotid Bruits    Pt has HTN, PAD, DM Type 2, CKD Stage 3, CABG x3 and Aortoiliac Occulsive Disease  Pt is currently taking Eliquis, ASA and Atorvastatin  HPI    Geeta Camacho 70 y/o F with multiple active medical problems who is referred for evaluation of aorto-iliac occlusive disease and peripheral arterial disease  The patient complains of Bilateral leg pain after about 20 minutes of walking (such as around the grocery store ) She is not active  She has no abdominal pain  No rest pain or wounds  She was previously followed at [facility] by Dr Angelika Maya for vascular disease  She underwent a CO2 angiogram 08/17/2018 with bilateral ALFREDITO VBX stents  As per [facility] notes, the a-gram study showed bilateral mild SFA disease and tibials were difficult to assess  In June 2019, she presented to [address] [place] [address] unable to walk  She was found to have a thrombosis of the R ALFREDITO stent which required thrombolysis followed by angioplasty of the bilateral common iliac artery stents and intervention to the right external iliac artery with angioplasty and stenting  The hospitalization was complicated by uncontrolled hypertension, acute kidney injury and uncontrolled diabetes mellitus  She is maintained on Eliquis and aspirin for vascular disease  Her family is unable to get her to [facility] so she is transferring cardiac and vascular care to [facility]  She is unreliable to take medications  She has uncontrolled diabetes mellitus with hemoglobin A1c 13 5  We discussed checking baseline vascular studies and having her return to review them  3/13: See consult 2/28/2020  The patient tells me that she thinks she is doing well for 69years old  She does not seem to have an interest in quitting smoking or working on her diabetes  She is again accompanied by her sister  We reviewed her vascular studies  She has significant aortoiliac and infrailiac artery stenosis as we suspected  However, overall she seems to function adequately  She lives alone but family checks on her  She has some dementia   She does not want to work on lifestyle changes for PAD  I suggested that she work on regular, progressive walking program and better diabetes management  She should also quit smoking  The feet are not threatened and she has no rest pain  We will continue with optimally tolerated medical therapy and periodic clinical monitoring  The AOIL and DIANE are abnormal  The DIANE appears similar to abstracted report from [facility] performed last year  AOIL 3/11/20  Prox aorta > 75% stenosis  SMA > 75% stenosis  DIANE 3/9/20  R  0 77/69/57; diffuse arterial occlusive disease v HG stenosis or occlusion of the prox SFA, 50-75% prox PF  L 1 18/120/99; diffuse arterial occlusive disease; 50-75% prox SFA; > 75% PF stenosis    We also reviewed the carotid duplex study which looked ok  There is less than 50% plaque bilaterally  We will continue with Eliquis and statin therapy  We reviewed symptoms of stroke for which she should call 911  Follow up carotid artery duplex in 2 years  Labs 02/20/2020:  Hemoglobin A1c 13 5, estimated average glucose 341  Total cholesterol 175 triglycerides 158 HDL 56 LDL 87      VAS AOIL 3/12/20  FINDINGS:     Unilateral     PSV (cm/s)  AP (cm)  TRV (cm)    Sup-Nakia Ao            350     14 0              Px Inf-Fernando Ao         166      1 6       1 6    Ds Inf-Fernando Ao         163      0 7              SMA Ostial            576                          Segment     Right                              Left                      Impression    PSV (cm/s)  AP (cm)  PSV (cm/s)  EDV (cm/s)  AP (cm)    Prox ALFREDITO    patent stent         180                  117                  0 8    Dist ALFREDITO    patent stent         134                  171                         Prox   EIA   patent stent         150      0 9         150                  0 8    Dist EIA                         150      0 7         119                  0 7    Prox Renal                                             51           7                      CONCLUSION:     Impression  The proximal abdominal aorta has a >75% stenosis and is normal in caliber  measuring 1 6 cm in its greatest diameter  The origin of the SMA has a >75%stenosis  Patent common iliac arteries and stents bilaterally  Ankle/Brachial indices are: Right-0 77 (Prior 1 19 ) and Left-1 18 (Prior 1 06  )  Great toe pressures are within the healing range  PVR/ PPG tracings are  dampened  No prior studies are available for comparison  FINDINGS:     Segment                Right                         Left                                            Impression               PSV  Impression  PSV    Common Femoral Artery                           181              162    Prox Profunda          50-75%                   272  >75%        316    Prox SFA               High-grade vs  Occluded   58  50-75%      236    Mid SFA                                          44              184    Dist SFA                                         75              151    Proximal Pop                                     61               91    Distal Pop                                       53               85    Tibioperoneal                                    33               62    Prox Post Tibial                                 55               85    Dist Post Tibial                                 45               76    Dist  Ant  Tibial                                26               75    Prox Peroneal                                    32               46    Dist Peroneal                                    41               55             CONCLUSION:  Impression:  RIGHT LOWER LIMB:  There is diffuse arterial occlusive disease noted with a high-grade stenosis vs  occlusion of the proximal superficial femoral artery (recannalized flow proximal  to mid region of the SFA) and a 50-75% of the proximal profunda femoris artery  Ankle/Brachial index: 0 77, which is within the moderate range  Prior 1 19    PVR/ PPG tracings are dampened  Metatarsal pressure of 69 mmHg  Great toe pressure of 57 mmHg, within the healing range  LEFT LOWER LIMB:  There is diffuse arterial occlusive disease noted with a 50-75% stenosis of the  proximal superficial femoral artery and >75% of the proximal profunda femoris  artery  Ankle/Brachial index: 1 18, which is within the normal range  Prior 1 06  PVR/ PPG tracings are slightly dampened  Metatarsal pressure of 120 mmHg  Great toe pressure of 99 mmHg, within the healing range  Compared to previous study on 7/17/2008, there is a decrease in ANGELA on the  right  Carotid artery du 3/10/2020   FINDINGS:     Right        Impression  PSV  EDV (cm/s)  Ratio    Dist  ICA                 72          22   1 16    Mid  ICA                  85          25   1 37    Prox  ICA    1 - 49%     101          26   1 63    Dist CCA                  52          11           Mid CCA                   62          13   0 93    Prox CCA                  67          13   0 79    Ext Carotid              106          14   1 71    Prox Vert                 68          19           Subclavian               101                       Innominate                85          11              Left         Impression  PSV  EDV (cm/s)  Ratio    Dist  ICA                 73          26   1 04    Mid  ICA                  93          26   1 33    Prox  ICA    1 - 49%     108          26   1 54    Dist CCA                  65          18           Mid CCA                   70          18   1 11    Prox CCA                  63          14           Ext Carotid              131               1 87    Prox Vert                 68          18           Subclavian               138                                CONCLUSION:  Impression  RIGHT:  There is <50% stenosis noted in the internal carotid artery  Plaque is  heterogenous and irregular  Vertebral artery flow is antegrade  There is no significant subclavian artery  disease  LEFT:  There is <50% stenosis noted in the internal carotid artery  Plaque is  heterogenous and irregular  Vertebral artery flow is antegrade  There is no significant subclavian artery  disease  No previous exam for comparison  Recommend repeat duplex in 2 years  The following portions of the patient's history were reviewed and updated as appropriate: allergies, current medications, past family history, past medical history, past social history, past surgical history and problem list     Review of Systems   Constitutional: Negative  HENT: Negative  Eyes: Negative  Respiratory: Positive for cough  Cardiovascular: Negative  Gastrointestinal: Negative  Endocrine: Negative  Genitourinary: Negative  Musculoskeletal: Positive for gait problem  Skin: Negative  Allergic/Immunologic: Negative  Neurological: Negative for dizziness, light-headedness and headaches  Hematological: Bruises/bleeds easily  Psychiatric/Behavioral: Negative  Objective:      /80 (BP Location: Left arm, Patient Position: Sitting, Cuff Size: Adult)   Pulse 80   Temp (!) 97 °F (36 1 °C) (Tympanic)   Resp 16   Ht 5' (1 524 m)   Wt 60 8 kg (134 lb)   BMI 26 17 kg/m²          Physical Exam   Constitutional: She is oriented to person, place, and time  She appears well-developed and well-nourished  She is cooperative  HENT:   Head: Normocephalic and atraumatic  Eyes: Pupils are equal, round, and reactive to light  EOM are normal    Neck: Trachea normal  Neck supple  No JVD present  No thyromegaly present  Cardiovascular: Normal rate, regular rhythm, S1 normal, S2 normal and normal heart sounds  Exam reveals no gallop and no friction rub  No murmur heard  Pulses:       Carotid pulses are 2+ on the right side with bruit, and 2+ on the left side with bruit  Radial pulses are 2+ on the right side, and 2+ on the left side          Dorsalis pedis pulses are 0 on the right side, and 0 on the left side  Posterior tibial pulses are 0 on the right side, and 0 on the left side  Pulmonary/Chest: Effort normal and breath sounds normal  No accessory muscle usage  No respiratory distress  She has no wheezes  She has no rales  Abdominal: Soft  Bowel sounds are normal  She exhibits no distension  There is no hepatosplenomegaly  There is no tenderness  Musculoskeletal: Normal range of motion  She exhibits no edema or deformity  Neurological: She is alert and oriented to person, place, and time  Grossly normal    Skin: Skin is warm and dry  No lesion and no rash noted  No cyanosis  Nails show no clubbing  Psychiatric: She has a normal mood and affect  Nursing note and vitals reviewed  I have reviewed and made appropriate changes to the review of systems input by the medical assistant      Vitals:    03/13/20 0845   BP: 152/80   BP Location: Left arm   Patient Position: Sitting   Cuff Size: Adult   Pulse: 80   Resp: 16   Temp: (!) 97 °F (36 1 °C)   TempSrc: Tympanic   Weight: 60 8 kg (134 lb)   Height: 5' (1 524 m)       Patient Active Problem List   Diagnosis    Diabetic polyneuropathy associated with type 2 diabetes mellitus ([facility])    Corns    Pain in both feet    Peripheral arteriosclerosis ([facility])    Radiculopathy of lumbar region    Onychomycosis    MATTI [facility]acute kidney injury) ([facility] [address] [facility]    Generalized weakness    Right otitis media    Essential hypertension    Hyponatremia    PAD (peripheral artery disease) ([facility])    Type 2 diabetes mellitus with hyperglycemia, with long-term current use of insulin (HCC)    Metabolic encephalopathy    3-vessel coronary artery disease    S/P CABG x 3    Dyslipidemia    Stage 3 chronic kidney disease (HCC)    Tachycardia    Bilateral carotid bruits    Aortoiliac occlusive disease (HCC)    Smoking addiction       Past Surgical History:   Procedure Laterality Date    CHOLECYSTECTOMY      CORONARY ARTERY BYPASS GRAFT      SPINAL FUSION      VASCULAR SURGERY Bilateral     Stent in legs       Family History   Problem Relation Age of Onset    Arthritis Mother     Cancer Mother     Diabetes Father     Stroke Father     Heart disease Father     Breast cancer Sister        Social History     Socioeconomic History    Marital status:       Spouse name: Not on file    Number of children: Not on file    Years of education: Not on file    Highest education level: Not on file   Occupational History    Not on file   Social Needs    Financial resource strain: Not on file    Food insecurity:     Worry: Not on file     Inability: Not on file    Transportation needs:     Medical: Not on file     Non-medical: Not on file   Tobacco Use    Smoking status: Current Some Day Smoker     Packs/day: 0 25     Types: Cigarettes    Smokeless tobacco: Never Used   Substance and Sexual Activity    Alcohol use: Not Currently     Frequency: Never    Drug use: Yes     Comment: occ    Sexual activity: Not on file   Lifestyle    Physical activity:     Days per week: Not on file     Minutes per session: Not on file    Stress: Not on file   Relationships    Social connections:     Talks on phone: Not on file     Gets together: Not on file     Attends Confucianist service: Not on file     Active member of club or organization: Not on file     Attends meetings of clubs or organizations: Not on file     Relationship status: Not on file    Intimate partner violence:     Fear of current or ex partner: Not on file     Emotionally abused: Not on file     Physically abused: Not on file     Forced sexual activity: Not on file   Other Topics Concern    Not on file   Social History Narrative    Not on file       Allergies   Allergen Reactions    Penicillins Hives     Tolerated ceftriaxone 9/2019          Current Outpatient Medications:     amLODIPine (NORVASC) 5 mg tablet, Take 5 mg by mouth daily , Disp: , Rfl:     apixaban (ELIQUIS) 5 mg, Take 1 tablet (5 mg total) by mouth 2 (two) times a day, Disp: , Rfl: 0    Ascorbic Acid (VITAMIN C) 100 MG tablet, Take 500 mg by mouth daily , Disp: , Rfl:     aspirin 81 mg chewable tablet, Chew 81 mg daily , Disp: , Rfl:     atorvastatin (LIPITOR) 40 mg tablet, Take 40 mg by mouth daily at bedtime, Disp: , Rfl:     carvedilol (COREG) 25 mg tablet, Take 25 mg by mouth 2 (two) times a day with meals , Disp: , Rfl:     cholecalciferol (VITAMIN D3) 1,000 units tablet, Take 1,000 Units by mouth daily , Disp: , Rfl:     cyanocobalamin (VITAMIN B-12) 100 mcg tablet, Take 1,000 mcg by mouth daily , Disp: , Rfl:     doxazosin (CARDURA) 4 mg tablet, Take 4 mg by mouth daily at bedtime, Disp: , Rfl:     furosemide (LASIX) 40 mg tablet, Take 40 mg by mouth daily as needed (edema) , Disp: , Rfl:     gabapentin (NEURONTIN) 100 mg capsule, Take 1 capsule (100 mg total) by mouth 3 (three) times a day (Patient taking differently: Take 300 mg by mouth 2 (two) times a day ), Disp: 90 capsule, Rfl: 0    insulin glargine (LANTUS) 100 units/mL subcutaneous injection, Inject 25 Units under the skin daily at bedtime, Disp: , Rfl: 0    insulin lispro (HumaLOG) 100 units/mL injection, Inject 1-5 Units under the skin daily at bedtime (Patient taking differently: Inject 1-5 Units under the skin 2 (two) times a day ), Disp: , Rfl: 0    insulin lispro (HumaLOG) 100 units/mL injection, Inject 1-6 Units under the skin 3 (three) times a day before meals, Disp: , Rfl: 0    ofloxacin (FLOXIN) 0 3 % otic solution, Administer 5 drops to the right ear 2 (two) times a day, Disp: , Rfl:     PARoxetine (PAXIL) 20 mg tablet, Take 20 mg by mouth daily , Disp: , Rfl:     ranitidine (ZANTAC) 150 mg tablet, Take 150 mg by mouth daily at bedtime as needed for heartburn , Disp: , Rfl:     Vitamin E 400 units TABS, Take 400 Units by mouth daily , Disp: , Rfl:

## 2020-03-13 NOTE — PATIENT INSTRUCTIONS
Peripheral arterial disease with leg pain on activity      -Regular walking and exercise program  You should walk for 10 minutes continuously for 1 week and then increase 1 minutes weekly  -recommend smoking cessation    -continue with optimal medical therapy on aspirin statin    -she is also on lifelong anticoagulation as long as she can tolerated due to peripheral arterial disease and history of iliac stent thrombosis    -check feet daily for wounds    -work on diabetes mellitus control and good diabetic diet    -follow-up a while and LE in 6 months and office visit with Dr Arsen Durham    -we discussed reasons to be seen sooner        Peripheral Artery Disease   AMBULATORY CARE:   Peripheral artery disease (PAD)  is narrow, weak, or blocked arteries  It may affect any arteries outside of your heart and brain  PAD is usually the result of a buildup of fat and cholesterol, also called plaque, along your artery walls  Inflammation, a blood clot, or abnormal cell growth could also block your arteries  PAD prevents normal blood flow to your legs and arms  You are at risk of an amputation if poor blood flow keeps wounds from healing or causes gangrene (tissue death)  Without treatment, PAD can also cause a heart attack or stroke  Common symptoms include:  Mild PAD usually does not cause symptoms   As the disease worsens over time, you may have the following:  · Pain or cramps in your leg or hip while you walk     · A numb, weak, or heavy feeling in your legs     · Dry, scaly, red, or pale skin on your legs     · Thick or brittle nails, or hair loss on your arms and legs     · Foot sores that will not heal     · Burning or aching in your feet and toes while resting (this may be worse when you lie down)  Call 911 for the following:   · You have any of the following signs of a heart attack:      ¨ Squeezing, pressure, or pain in your chest that lasts longer than 5 minutes or returns    ¨ Discomfort or pain in your back, neck, jaw, stomach, or arm     ¨ Trouble breathing    ¨ Nausea or vomiting    ¨ Lightheadedness or a sudden cold sweat, especially with chest pain or trouble breathing    · You have any of the following signs of a stroke:      ¨ Numbness or drooping on one side of your face     ¨ Weakness in an arm or leg    ¨ Confusion or difficulty speaking    ¨ Dizziness, a severe headache, or vision loss  Seek care immediately if:   · You have sores or wounds that will not heal      · You notice black or discolored skin on your arm or leg  · Your skin is cool to the touch  Contact your healthcare provider if:   · You have leg pain when you walk 1/8 mile (200 meters) or less, even with treatment  · Your legs are red, dry, or pale, even with treatment  · You have questions or concerns about your condition or care  Treatment for PAD  can help reduce your risk of a heart attack, stroke, or amputation  You may need more than one of the following:  · Medicines  may be given to prevent blood clots and reduce the risk of a heart attack or stroke  You may be given medicine to help prevent your PAD from getting worse  · A supervised exercise program  helps you stay active in normal daily activities and may prevent disability  Healthcare providers will help you safely walk or do strength training exercises 3 times a week for 30 to 60 minutes  You will do this for several months, then transition to walking on your own  · Angioplasty  is a procedure to open your artery so blood can flow through normally  A thin tube called a catheter is used to insert a small balloon into your artery  The balloon is inflated to open your blocked artery, and then removed  A tube called a stent may be placed in your artery to hold it open  · Bypass surgery  is used to make a new connection to your artery with a vein from another part of your body, or an artificial graft   The vein or graft is attached to your artery above and below your blockage  This allows blood to flow around the blocked portion of your artery  Manage and prevent PAD:   · Walk for 30 to 60 minutes at least 4 times a week  Your healthcare provider may also refer you to an supervised exercise program  The program helps increase how far you can walk without pain  It also helps you stay active in normal daily activities and may prevent disability caused by PAD  · Do not smoke  Nicotine and other chemicals in cigarettes and cigars can worsen PAD  They can also increase your risk for a heart attack or stroke  Ask your healthcare provider for information if you currently smoke and need help to quit  E-cigarettes or smokeless tobacco still contain nicotine  Talk to your healthcare provider before you use these products  · Manage other health conditions  Take your medicines as directed and follow your healthcare provider's instructions if you have high blood pressure or high cholesterol  Perform foot care and check your blood sugar levels as directed if you have diabetes  · Eat heart healthy foods  Eat whole grains, fruits, and vegetables every day  Limit salt and high-fat foods  Ask your healthcare provider for more information on a heart healthy diet  Ask if you need to lose weight  Your healthcare provider can help you create a healthy weight-loss plan  Follow up with your healthcare provider as directed:  Write down your questions so you remember to ask them during your visits  © 2017 [facility] [facility] Information is for End User's use only and may not be sold, redistributed or otherwise used for commercial purposes  All illustrations and images included in CareNotes® are the copyrighted property of [employer]  or Jaspreet Brown  The above information is an  only  It is not intended as medical advice for individual conditions or treatments   Talk to your doctor, nurse or pharmacist before following any medical regimen to see if it is safe and effective for you

## 2020-03-16 ENCOUNTER — HOSPITAL ENCOUNTER (OUTPATIENT)
Dept: RADIOLOGY | Facility: HOSPITAL | Age: 75
Discharge: HOME | End: 2020-03-16
Payer: MEDICARE

## 2020-03-16 VITALS — DIASTOLIC BLOOD PRESSURE: 68 MMHG | SYSTOLIC BLOOD PRESSURE: 152 MMHG

## 2020-03-16 DIAGNOSIS — R92.8 ABNORMAL FINDING ON BREAST IMAGING: ICD-10-CM

## 2020-03-16 PROCEDURE — 88305 TISSUE EXAM BY PATHOLOGIST: CPT | Performed by: PATHOLOGY

## 2020-03-16 PROCEDURE — 19081 BX BREAST 1ST LESION STRTCTC: CPT

## 2020-03-16 RX ORDER — LIDOCAINE HYDROCHLORIDE 10 MG/ML
INJECTION, SOLUTION EPIDURAL; INFILTRATION; INTRACAUDAL; PERINEURAL CODE/TRAUMA/SEDATION MEDICATION
Status: COMPLETED | OUTPATIENT
Start: 2020-03-16 | End: 2020-03-16

## 2020-03-16 RX ORDER — LIDOCAINE HYDROCHLORIDE AND EPINEPHRINE 10; 10 MG/ML; UG/ML
INJECTION, SOLUTION INFILTRATION; PERINEURAL CODE/TRAUMA/SEDATION MEDICATION
Status: COMPLETED | OUTPATIENT
Start: 2020-03-16 | End: 2020-03-16

## 2020-03-16 RX ADMIN — LIDOCAINE HYDROCHLORIDE AND EPINEPHRINE 9 ML: 10; 10 INJECTION, SOLUTION INFILTRATION; PERINEURAL at 14:20

## 2020-03-16 RX ADMIN — LIDOCAINE HYDROCHLORIDE 2 ML: 10 INJECTION, SOLUTION EPIDURAL; INFILTRATION; INTRACAUDAL; PERINEURAL at 14:07

## 2020-03-16 NOTE — SEDATION DOCUMENTATION
Stereotactic right breast biopsy with clip placement done  Pt  Tolerated well  No bleeding or ecchymosis noted  Area soft to touch  Steristrips and DSD to site  Discharge instructions reviewed with pt  and sister and copy to her  Ice pack provided  Pt  Did hold Eliquis for 2 days pre biopsy,but, was instructed to not hold it;to hold the ASA only  Pt  And sister instructed to resume ASA and Eliquis today

## 2020-03-17 ENCOUNTER — TELEPHONE (OUTPATIENT)
Dept: RADIOLOGY | Facility: HOSPITAL | Age: 75
End: 2020-03-17

## 2020-03-19 ENCOUNTER — TELEPHONE (OUTPATIENT)
Dept: RADIOLOGY | Facility: HOSPITAL | Age: 75
End: 2020-03-19

## 2020-03-19 NOTE — TELEPHONE ENCOUNTER
Post procedure call completed on 3/19/20 at 0928  Bleeding: _____yes __x___no    Pain: _____yes ____x__no    Redness/Swelling: ______yes __x____no    Band aid removed: _x____yes _____no    Steri-Strips intact: ____x__yes _____no  Paola Zhang also given breast pathology results

## 2021-08-08 NOTE — PATIENT INSTRUCTIONS
Peripheral Arterial Disease    -Regular exercise / walking program  -Work on diabetes control  -Work on smoking cessation    -Maintain healthy weight   -Medical therapy  - continue with aspirin and atorvastatin 40  -Check aortoiliac duplex and lower extremity arterial duplex  -Follow up office visit after testing to review  -Call or return sooner for increased pain in your legs; constant numbness, tingling or coldness in the legs; or if you develop wounds on your toes/feet that do not heal            Peripheral Artery Disease   AMBULATORY CARE:   Peripheral artery disease (PAD)  is narrow, weak, or blocked arteries  It may affect any arteries outside of your heart and brain  PAD is usually the result of a buildup of fat and cholesterol, also called plaque, along your artery walls  Inflammation, a blood clot, or abnormal cell growth could also block your arteries  PAD prevents normal blood flow to your legs and arms  You are at risk of an amputation if poor blood flow keeps wounds from healing or causes gangrene (tissue death)  Without treatment, PAD can also cause a heart attack or stroke  Common symptoms include:  Mild PAD usually does not cause symptoms   As the disease worsens over time, you may have the following:  · Pain or cramps in your leg or hip while you walk     · A numb, weak, or heavy feeling in your legs     · Dry, scaly, red, or pale skin on your legs     · Thick or brittle nails, or hair loss on your arms and legs     · Foot sores that will not heal     · Burning or aching in your feet and toes while resting (this may be worse when you lie down)  Call 911 for the following:   · You have any of the following signs of a heart attack:      ¨ Squeezing, pressure, or pain in your chest that lasts longer than 5 minutes or returns    ¨ Discomfort or pain in your back, neck, jaw, stomach, or arm     ¨ Trouble breathing    ¨ Nausea or vomiting    ¨ Lightheadedness or a sudden cold sweat, especially with chest pain or trouble breathing    · You have any of the following signs of a stroke:      ¨ Numbness or drooping on one side of your face     ¨ Weakness in an arm or leg    ¨ Confusion or difficulty speaking    ¨ Dizziness, a severe headache, or vision loss  Seek care immediately if:   · You have sores or wounds that will not heal      · You notice black or discolored skin on your arm or leg  · Your skin is cool to the touch  Contact your healthcare provider if:   · You have leg pain when you walk 1/8 mile (200 meters) or less, even with treatment  · Your legs are red, dry, or pale, even with treatment  · You have questions or concerns about your condition or care  Treatment for PAD  can help reduce your risk of a heart attack, stroke, or amputation  You may need more than one of the following:  · Medicines  may be given to prevent blood clots and reduce the risk of a heart attack or stroke  You may be given medicine to help prevent your PAD from getting worse  · A supervised exercise program  helps you stay active in normal daily activities and may prevent disability  Healthcare providers will help you safely walk or do strength training exercises 3 times a week for 30 to 60 minutes  You will do this for several months, then transition to walking on your own  · Angioplasty  is a procedure to open your artery so blood can flow through normally  A thin tube called a catheter is used to insert a small balloon into your artery  The balloon is inflated to open your blocked artery, and then removed  A tube called a stent may be placed in your artery to hold it open  · Bypass surgery  is used to make a new connection to your artery with a vein from another part of your body, or an artificial graft  The vein or graft is attached to your artery above and below your blockage  This allows blood to flow around the blocked portion of your artery    Manage and prevent PAD:   · Walk for 30 to 60 minutes at least 4 times a week  Your healthcare provider may also refer you to an supervised exercise program  The program helps increase how far you can walk without pain  It also helps you stay active in normal daily activities and may prevent disability caused by PAD  · Do not smoke  Nicotine and other chemicals in cigarettes and cigars can worsen PAD  They can also increase your risk for a heart attack or stroke  Ask your healthcare provider for information if you currently smoke and need help to quit  E-cigarettes or smokeless tobacco still contain nicotine  Talk to your healthcare provider before you use these products  · Manage other health conditions  Take your medicines as directed and follow your healthcare provider's instructions if you have high blood pressure or high cholesterol  Perform foot care and check your blood sugar levels as directed if you have diabetes  · Eat heart healthy foods  Eat whole grains, fruits, and vegetables every day  Limit salt and high-fat foods  Ask your healthcare provider for more information on a heart healthy diet  Ask if you need to lose weight  Your healthcare provider can help you create a healthy weight-loss plan  Follow up with your healthcare provider as directed:  Write down your questions so you remember to ask them during your visits  © 2017 [address] [facility] Information is for End User's use only and may not be sold, redistributed or otherwise used for commercial purposes  All illustrations and images included in CareNotes® are the copyrighted property of [employer]  or Jaspreet Brown  The above information is an  only  It is not intended as medical advice for individual conditions or treatments  Talk to your doctor, nurse or pharmacist before following any medical regimen to see if it is safe and effective for you  TD: Pt approved for stepdown unit by ICU fellow, approved by Dr. Garland. Pt endorsed to MAR.